# Patient Record
Sex: FEMALE | Race: WHITE | NOT HISPANIC OR LATINO | Employment: OTHER | URBAN - METROPOLITAN AREA
[De-identification: names, ages, dates, MRNs, and addresses within clinical notes are randomized per-mention and may not be internally consistent; named-entity substitution may affect disease eponyms.]

---

## 2017-01-24 RX ORDER — LOSARTAN POTASSIUM 50 MG/1
TABLET ORAL
Qty: 30 TABLET | Refills: 0 | Status: SHIPPED | OUTPATIENT
Start: 2017-01-24 | End: 2017-02-21 | Stop reason: SDUPTHER

## 2017-02-22 RX ORDER — LOSARTAN POTASSIUM 50 MG/1
TABLET ORAL
Qty: 30 TABLET | Refills: 0 | Status: SHIPPED | OUTPATIENT
Start: 2017-02-22 | End: 2017-03-23 | Stop reason: SDUPTHER

## 2017-03-23 RX ORDER — LOSARTAN POTASSIUM 50 MG/1
TABLET ORAL
Qty: 30 TABLET | Refills: 0 | Status: SHIPPED | OUTPATIENT
Start: 2017-03-23 | End: 2017-04-19 | Stop reason: SDUPTHER

## 2017-04-19 RX ORDER — LOSARTAN POTASSIUM 50 MG/1
TABLET ORAL
Qty: 30 TABLET | Refills: 0 | Status: SHIPPED | OUTPATIENT
Start: 2017-04-19 | End: 2017-05-29 | Stop reason: SDUPTHER

## 2017-05-29 RX ORDER — LOSARTAN POTASSIUM 50 MG/1
TABLET ORAL
Qty: 30 TABLET | Refills: 0 | Status: SHIPPED | OUTPATIENT
Start: 2017-05-29 | End: 2017-06-27 | Stop reason: SDUPTHER

## 2017-06-28 RX ORDER — LOSARTAN POTASSIUM 50 MG/1
TABLET ORAL
Qty: 30 TABLET | Refills: 0 | Status: SHIPPED | OUTPATIENT
Start: 2017-06-28 | End: 2017-07-28 | Stop reason: SDUPTHER

## 2017-07-31 RX ORDER — LOSARTAN POTASSIUM 50 MG/1
TABLET ORAL
Qty: 30 TABLET | Refills: 0 | Status: SHIPPED | OUTPATIENT
Start: 2017-07-31 | End: 2017-08-31 | Stop reason: SDUPTHER

## 2017-08-31 RX ORDER — LOSARTAN POTASSIUM 50 MG/1
TABLET ORAL
Qty: 30 TABLET | Refills: 0 | Status: SHIPPED | OUTPATIENT
Start: 2017-08-31 | End: 2017-09-28 | Stop reason: SDUPTHER

## 2017-09-28 RX ORDER — LOSARTAN POTASSIUM 50 MG/1
TABLET ORAL
Qty: 30 TABLET | Refills: 0 | Status: SHIPPED | OUTPATIENT
Start: 2017-09-28 | End: 2017-10-30 | Stop reason: SDUPTHER

## 2017-10-30 RX ORDER — LOSARTAN POTASSIUM 50 MG/1
TABLET ORAL
Qty: 30 TABLET | Refills: 0 | Status: SHIPPED | OUTPATIENT
Start: 2017-10-30 | End: 2018-11-28

## 2017-12-13 ENCOUNTER — OFFICE VISIT (OUTPATIENT)
Dept: OBSTETRICS AND GYNECOLOGY | Facility: CLINIC | Age: 62
End: 2017-12-13
Payer: COMMERCIAL

## 2017-12-13 ENCOUNTER — HOSPITAL ENCOUNTER (OUTPATIENT)
Dept: RADIOLOGY | Facility: HOSPITAL | Age: 62
Discharge: HOME | End: 2017-12-13
Attending: OBSTETRICS & GYNECOLOGY
Payer: COMMERCIAL

## 2017-12-13 VITALS — HEIGHT: 63 IN | BODY MASS INDEX: 32.07 KG/M2 | WEIGHT: 181 LBS

## 2017-12-13 VITALS
SYSTOLIC BLOOD PRESSURE: 132 MMHG | BODY MASS INDEX: 32.11 KG/M2 | HEIGHT: 63 IN | RESPIRATION RATE: 14 BRPM | DIASTOLIC BLOOD PRESSURE: 70 MMHG | WEIGHT: 181.19 LBS

## 2017-12-13 DIAGNOSIS — Z12.31 SCREENING MAMMOGRAM, ENCOUNTER FOR: ICD-10-CM

## 2017-12-13 DIAGNOSIS — Z12.4 ENCOUNTER FOR PAP SMEAR OF CERVIX WITH HPV DNA COTESTING: ICD-10-CM

## 2017-12-13 DIAGNOSIS — Z12.31 SCREENING MAMMOGRAM, ENCOUNTER FOR: Primary | ICD-10-CM

## 2017-12-13 PROCEDURE — 99999 PR PBB SHADOW E&M-EST. PATIENT-LVL III: CPT | Mod: PBBFAC,,, | Performed by: OBSTETRICS & GYNECOLOGY

## 2017-12-13 PROCEDURE — 77067 SCR MAMMO BI INCL CAD: CPT | Mod: TC,PN

## 2017-12-13 PROCEDURE — 88175 CYTOPATH C/V AUTO FLUID REDO: CPT

## 2017-12-13 PROCEDURE — 77067 SCR MAMMO BI INCL CAD: CPT | Mod: 26,,, | Performed by: RADIOLOGY

## 2017-12-13 PROCEDURE — 99386 PREV VISIT NEW AGE 40-64: CPT | Mod: S$GLB,,, | Performed by: OBSTETRICS & GYNECOLOGY

## 2017-12-13 PROCEDURE — 77063 BREAST TOMOSYNTHESIS BI: CPT | Mod: 26,,, | Performed by: RADIOLOGY

## 2017-12-13 PROCEDURE — 87624 HPV HI-RISK TYP POOLED RSLT: CPT

## 2017-12-13 RX ORDER — DULOXETIN HYDROCHLORIDE 60 MG/1
60 CAPSULE, DELAYED RELEASE ORAL DAILY
COMMUNITY
Start: 2017-12-06 | End: 2019-09-26 | Stop reason: SDUPTHER

## 2017-12-13 RX ORDER — ATORVASTATIN CALCIUM 20 MG/1
20 TABLET, FILM COATED ORAL NIGHTLY
COMMUNITY
Start: 2017-11-22 | End: 2019-10-17 | Stop reason: SDUPTHER

## 2017-12-13 RX ORDER — OXYBUTYNIN CHLORIDE 5 MG/1
5 TABLET, EXTENDED RELEASE ORAL NIGHTLY
COMMUNITY
Start: 2017-11-22 | End: 2019-02-05

## 2017-12-13 NOTE — PROGRESS NOTES
Chief Complaint   Patient presents with    Well Woman       History and Physical:  No LMP recorded. Patient is postmenopausal.       Daija Traylor is a 62 y.o.  female who presents today for her routine annual GYN exam. The patient has no Gynecology complaints today.       Allergies: Review of patient's allergies indicates:  No Known Allergies    Past Medical History:   Diagnosis Date    COPD (chronic obstructive pulmonary disease)     Hypercholesteremia     Hypertension        Past Surgical History:   Procedure Laterality Date    ANTERIOR CRUCIATE LIGAMENT REPAIR      CARPAL TUNNEL RELEASE      CERVICAL FUSION      TOTAL HIP ARTHROPLASTY         MEDS:   Current Outpatient Prescriptions on File Prior to Visit   Medication Sig Dispense Refill    losartan (COZAAR) 50 MG tablet TAKE 1 TABLET (50 MG TOTAL) BY MOUTH ONCE DAILY. 30 tablet 0    aspirin (ECOTRIN) 81 MG EC tablet Take 1 tablet (81 mg total) by mouth once daily.  0    [DISCONTINUED] fluticasone-salmeterol 100-50 mcg/dose (ADVAIR) 100-50 mcg/dose diskus inhaler Inhale 1 puff into the lungs 2 (two) times daily.      [DISCONTINUED] lovastatin (MEVACOR) 40 MG tablet Take 1 tablet (40 mg total) by mouth every evening. 90 tablet 0     No current facility-administered medications on file prior to visit.        OB History     No data available          Social History     Social History    Marital status: Single     Spouse name: N/A    Number of children: N/A    Years of education: N/A     Occupational History    Not on file.     Social History Main Topics    Smoking status: Current Every Day Smoker     Packs/day: 1.00     Types: Cigarettes    Smokeless tobacco: Never Used    Alcohol use 1.2 oz/week     2 Glasses of wine per week      Comment: occasionally    Drug use: Unknown    Sexual activity: Not on file     Other Topics Concern    Not on file     Social History Narrative    No narrative on file       Family History   Problem  "Relation Age of Onset    Heart disease Mother     Heart disease Father          Past medical and surgical history reviewed.   I have reviewed the patient's medical history in detail and updated the computerized patient record.        Review of System:   General: no chills, fever, night sweats, weight gain or weight loss  Psychological: no depression or suicidal ideation  Breasts: no new or changing breast lumps, nipple discharge or masses.  Respiratory: no cough, shortness of breath, or wheezing  Cardiovascular: no chest pain or dyspnea on exertion  Gastrointestinal: no abdominal pain, change in bowel habits, or black or bloody stools  Genito-Urinary: no incontinence, urinary frequency/urgency or vulvar/vaginal symptoms, pelvic pain or abnormal vaginal bleeding.  Musculoskeletal: no gait disturbance or muscular weakness      Physical Exam:   /70   Resp 14   Ht 5' 3" (1.6 m)   Wt 82.2 kg (181 lb 3.5 oz)   BMI 32.10 kg/m²   Constitutional: She is oriented to person, place, and time. She appears well-developed and well-nourished. No distress.   HENT:   Head: Normocephalic and atraumatic.   Eyes: Conjunctivae and EOM are normal. No scleral icterus.   Neck: Normal range of motion. Neck supple. No tracheal deviation present.   Cardiovascular: Normal rate.    Pulmonary/Chest: Effort normal. No respiratory distress. She exhibits no tenderness.  Breasts: are symmetrical.   Right breast exhibits no inverted nipple, no mass, no nipple discharge, no skin change and no tenderness.   Left breast exhibits no inverted nipple, no mass, no nipple discharge, no skin change and no tenderness.  Abdominal: Soft. She exhibits no distension and no mass. There is no tenderness. There is no rebound and no guarding.   Genitourinary:    External rectal exam shows no thrombosed external hemorrhoids.    Pelvic exam was performed with patient supine.   No labial fusion.   There is no rash, lesion or injury on the right labia.   There " is no rash, lesion or injury on the left labia.   No bleeding and no signs of injury around the vaginal introitus, urethra is without lesions and well supported. The cervix is visualized with no discharge, lesions or friability.   No vaginal discharge found.   No significant Cystocele, Enterocele or rectocele, and uterus well supported.   Bimanual exam:   The urethra is normal to palpation and there are no palpable vaginal wall masses.   Uterus is not deviated, not enlarged, not fixed, normal shape and not tender.   Cervix exhibits no motion tenderness.    Right adnexum displays no mass and no tenderness.   Left adnexum displays no mass and no tenderness.  Musculoskeletal: Normal range of motion.   Lymphadenopathy: No inguinal adenopathy present.   Neurological: She is alert and oriented to person, place, and time. Coordination normal.   Skin: Skin is warm and dry. She is not diaphoretic.   Psychiatric: She has a normal mood and affect.      Assessment:   Normal annual GYN exam  1. Screening mammogram, encounter for  CANCELED: Mammo Digital Screening Bilat With CAD   tobacco abuse- counseled    Plan:   PAP  Mammogram  Follow up in 1 year.  Patient informed will be contacted with results within 2 weeks. Encouraged to please call back or email if she has not heard from us by then.

## 2017-12-18 LAB
HPV16 AG SPEC QL: NEGATIVE
HPV16+18+H RISK 12 DNA CVX-IMP: NEGATIVE
HPV18 DNA SPEC QL NAA+PROBE: NEGATIVE

## 2017-12-20 ENCOUNTER — TELEPHONE (OUTPATIENT)
Dept: RADIOLOGY | Facility: HOSPITAL | Age: 62
End: 2017-12-20

## 2017-12-20 NOTE — TELEPHONE ENCOUNTER
..Patient notified of mammogram results,   Diagnostic mammogram is scheduled on 12/26/2017.    Additional views scheduled and noed on ammmogram

## 2017-12-26 ENCOUNTER — HOSPITAL ENCOUNTER (OUTPATIENT)
Dept: RADIOLOGY | Facility: HOSPITAL | Age: 62
Discharge: HOME | End: 2017-12-26
Attending: INTERNAL MEDICINE
Payer: COMMERCIAL

## 2017-12-26 ENCOUNTER — HOSPITAL ENCOUNTER (OUTPATIENT)
Dept: RADIOLOGY | Facility: HOSPITAL | Age: 62
Discharge: HOME | End: 2017-12-26
Attending: OBSTETRICS & GYNECOLOGY
Payer: COMMERCIAL

## 2017-12-26 DIAGNOSIS — R92.8 ABNORMAL MAMMOGRAM OF LEFT BREAST: ICD-10-CM

## 2017-12-26 PROCEDURE — 76642 ULTRASOUND BREAST LIMITED: CPT | Mod: 26,LT,, | Performed by: RADIOLOGY

## 2017-12-26 PROCEDURE — 77061 BREAST TOMOSYNTHESIS UNI: CPT | Mod: TC,PO,LT

## 2017-12-26 PROCEDURE — 76642 ULTRASOUND BREAST LIMITED: CPT | Mod: TC,PO,LT

## 2017-12-26 PROCEDURE — 77061 BREAST TOMOSYNTHESIS UNI: CPT | Mod: 26,LT,, | Performed by: RADIOLOGY

## 2017-12-26 PROCEDURE — 77065 DX MAMMO INCL CAD UNI: CPT | Mod: 26,LT,, | Performed by: RADIOLOGY

## 2018-03-14 PROBLEM — I25.10 CORONARY ARTERY DISEASE INVOLVING NATIVE CORONARY ARTERY OF NATIVE HEART WITHOUT ANGINA PECTORIS: Status: ACTIVE | Noted: 2018-03-14

## 2018-03-14 PROBLEM — I10 HYPERTENSION: Status: ACTIVE | Noted: 2018-03-14

## 2018-04-24 ENCOUNTER — OFFICE VISIT (OUTPATIENT)
Dept: URGENT CARE | Facility: CLINIC | Age: 63
End: 2018-04-24
Payer: COMMERCIAL

## 2018-04-24 VITALS
RESPIRATION RATE: 16 BRPM | OXYGEN SATURATION: 97 % | TEMPERATURE: 99 F | HEART RATE: 66 BPM | SYSTOLIC BLOOD PRESSURE: 130 MMHG | DIASTOLIC BLOOD PRESSURE: 82 MMHG

## 2018-04-24 DIAGNOSIS — J06.9 VIRAL URI WITH COUGH: Primary | ICD-10-CM

## 2018-04-24 PROCEDURE — 3075F SYST BP GE 130 - 139MM HG: CPT | Mod: CPTII,S$GLB,, | Performed by: PHYSICIAN ASSISTANT

## 2018-04-24 PROCEDURE — 99204 OFFICE O/P NEW MOD 45 MIN: CPT | Mod: S$GLB,,, | Performed by: PHYSICIAN ASSISTANT

## 2018-04-24 PROCEDURE — 96372 THER/PROPH/DIAG INJ SC/IM: CPT | Mod: S$GLB,,, | Performed by: FAMILY MEDICINE

## 2018-04-24 PROCEDURE — 3079F DIAST BP 80-89 MM HG: CPT | Mod: CPTII,S$GLB,, | Performed by: PHYSICIAN ASSISTANT

## 2018-04-24 RX ORDER — BETAMETHASONE SODIUM PHOSPHATE AND BETAMETHASONE ACETATE 3; 3 MG/ML; MG/ML
6 INJECTION, SUSPENSION INTRA-ARTICULAR; INTRALESIONAL; INTRAMUSCULAR; SOFT TISSUE
Status: COMPLETED | OUTPATIENT
Start: 2018-04-24 | End: 2018-04-24

## 2018-04-24 RX ORDER — MULTIVIT WITH MINERALS/HERBS
1 TABLET ORAL DAILY
COMMUNITY
End: 2019-10-17

## 2018-04-24 RX ORDER — ASPIRIN 81 MG/1
81 TABLET ORAL DAILY
COMMUNITY
End: 2019-05-25

## 2018-04-24 RX ADMIN — BETAMETHASONE SODIUM PHOSPHATE AND BETAMETHASONE ACETATE 6 MG: 3; 3 INJECTION, SUSPENSION INTRA-ARTICULAR; INTRALESIONAL; INTRAMUSCULAR; SOFT TISSUE at 09:04

## 2018-04-24 NOTE — PROGRESS NOTES
Subjective:       Patient ID: Daija Traylor is a 63 y.o. female.    Vitals:  oral temperature is 98.6 °F (37 °C). Her blood pressure is 130/82 and her pulse is 66. Her respiration is 16 and oxygen saturation is 97%.     Chief Complaint: Cough    Pt c/o productive cough, 5 days, nasal congestion, post nasal drip, ear congestion, took z pack, tessalon and mucinex with no relief,       Cough   This is a new problem. The current episode started in the past 7 days. The problem has been unchanged. The problem occurs constantly. The cough is productive of sputum. Associated symptoms include ear congestion, nasal congestion and postnasal drip. Pertinent negatives include no chest pain, chills, ear pain, eye redness, fever, headaches, myalgias, sore throat, shortness of breath or wheezing. Treatments tried: zpack, tessalon, mucinex. The treatment provided no relief.     Review of Systems   Constitution: Positive for malaise/fatigue. Negative for chills and fever.   HENT: Positive for congestion and postnasal drip. Negative for ear pain, hoarse voice and sore throat.    Eyes: Negative for discharge and redness.   Cardiovascular: Negative for chest pain, dyspnea on exertion and leg swelling.   Respiratory: Positive for cough and sputum production. Negative for shortness of breath and wheezing.    Musculoskeletal: Negative for myalgias.   Gastrointestinal: Negative for abdominal pain and nausea.   Neurological: Negative for headaches.       Objective:      Physical Exam   Constitutional: She is oriented to person, place, and time. She appears well-developed and well-nourished. She is cooperative.  Non-toxic appearance. She does not appear ill. No distress.   HENT:   Head: Normocephalic and atraumatic.   Right Ear: Hearing, external ear and ear canal normal. Tympanic membrane is not erythematous. A middle ear effusion is present.   Left Ear: Hearing, external ear and ear canal normal. Tympanic membrane is not erythematous. A  middle ear effusion is present.   Nose: Nose normal. No mucosal edema, rhinorrhea or nasal deformity. No epistaxis. Right sinus exhibits no maxillary sinus tenderness and no frontal sinus tenderness. Left sinus exhibits no maxillary sinus tenderness and no frontal sinus tenderness.   Mouth/Throat: Uvula is midline, oropharynx is clear and moist and mucous membranes are normal. No trismus in the jaw. Normal dentition. No uvula swelling. No posterior oropharyngeal erythema.   Eyes: Conjunctivae and lids are normal. No scleral icterus.   Sclera clear bilat   Neck: Trachea normal, full passive range of motion without pain and phonation normal. Neck supple.   Cardiovascular: Normal rate, regular rhythm, normal heart sounds, intact distal pulses and normal pulses.    Pulmonary/Chest: Effort normal and breath sounds normal. No respiratory distress. She has no wheezes.   Abdominal: Soft. Normal appearance and bowel sounds are normal. She exhibits no distension. There is no tenderness.   Musculoskeletal: Normal range of motion. She exhibits no edema or deformity.   Neurological: She is alert and oriented to person, place, and time. She exhibits normal muscle tone. Coordination normal.   Skin: Skin is warm, dry and intact. She is not diaphoretic. No pallor.   Psychiatric: She has a normal mood and affect. Her speech is normal and behavior is normal. Judgment and thought content normal. Cognition and memory are normal.   Nursing note and vitals reviewed.      Assessment:       1. Viral URI with cough        Plan:         Viral URI with cough    Other orders  -     betamethasone acetate-betamethasone sodium phosphate injection 6 mg; Inject 1 mL (6 mg total) into the muscle one time.      Discussed risks vs benefits of steroid injection and patient elects to proceed. Will continue with Tessalon perles or Coricidin HBP for symptoms. Explained likely viral and cough may persist for several weeks. Patient is a smoker. Discussed  smoking cessation. I discussed with the patient the importance of follow up if their symptoms have not resolved in 1-2 week's time. Patient verbalized understanding and will RTC or go to the nearest ER if symptoms persist or worsen.

## 2018-12-12 PROBLEM — K21.9 GASTROESOPHAGEAL REFLUX: Status: ACTIVE | Noted: 2018-12-12

## 2019-02-11 PROBLEM — K82.8 BILIARY DYSKINESIA: Status: ACTIVE | Noted: 2019-02-11

## 2019-05-28 RX ORDER — ATORVASTATIN CALCIUM 10 MG/1
TABLET, FILM COATED ORAL
Qty: 30 TABLET | Refills: 1 | Status: SHIPPED | OUTPATIENT
Start: 2019-05-28 | End: 2019-10-17

## 2019-09-01 RX ORDER — LOSARTAN POTASSIUM 50 MG/1
TABLET ORAL
Qty: 30 TABLET | Refills: 2 | Status: SHIPPED | OUTPATIENT
Start: 2019-09-01 | End: 2019-09-05 | Stop reason: SDUPTHER

## 2019-09-05 DIAGNOSIS — I10 HYPERTENSION, UNSPECIFIED TYPE: Primary | ICD-10-CM

## 2019-09-06 RX ORDER — LOSARTAN POTASSIUM 50 MG/1
50 TABLET ORAL DAILY
Qty: 90 TABLET | Refills: 3 | Status: SHIPPED | OUTPATIENT
Start: 2019-09-06 | End: 2020-03-24 | Stop reason: SDUPTHER

## 2019-09-27 RX ORDER — DULOXETIN HYDROCHLORIDE 60 MG/1
CAPSULE, DELAYED RELEASE ORAL
Qty: 90 CAPSULE | Refills: 3 | Status: SHIPPED | OUTPATIENT
Start: 2019-09-27

## 2019-10-09 ENCOUNTER — TELEPHONE (OUTPATIENT)
Dept: FAMILY MEDICINE | Facility: CLINIC | Age: 64
End: 2019-10-09

## 2019-10-09 NOTE — TELEPHONE ENCOUNTER
----- Message from Marilynn Izquierdo sent at 10/9/2019  3:25 PM CDT -----  Type: Needs Medical Advice    Who Called:  self  Symptoms (please be specific):  Has swelling above the elbow / has pain inside of elbow   How long has patient had these symptoms:  3 x days   Pharmacy name and phone #:     Best Call Back Number: [phone]  Or work  [phone]   Additional Information: not sure which Dr she should see for this ?

## 2019-10-09 NOTE — TELEPHONE ENCOUNTER
----- Message from Christina Velasco sent at 10/9/2019  3:53 PM CDT -----  Contact: patient  Type:  Patient Returning Call    Who Called:  patient  Who Left Message for Patient:  Eva   Does the patient know what this is regarding?:  yes  Best Call Back Number:  [phone]- her office number   Additional Information:

## 2019-10-17 ENCOUNTER — OFFICE VISIT (OUTPATIENT)
Dept: FAMILY MEDICINE | Facility: CLINIC | Age: 64
End: 2019-10-17
Payer: COMMERCIAL

## 2019-10-17 ENCOUNTER — TELEPHONE (OUTPATIENT)
Dept: FAMILY MEDICINE | Facility: CLINIC | Age: 64
End: 2019-10-17

## 2019-10-17 VITALS
HEART RATE: 78 BPM | SYSTOLIC BLOOD PRESSURE: 124 MMHG | BODY MASS INDEX: 33.04 KG/M2 | DIASTOLIC BLOOD PRESSURE: 78 MMHG | TEMPERATURE: 98 F | OXYGEN SATURATION: 99 % | WEIGHT: 186.5 LBS

## 2019-10-17 DIAGNOSIS — M77.10 LATERAL EPICONDYLITIS, UNSPECIFIED LATERALITY: Primary | ICD-10-CM

## 2019-10-17 DIAGNOSIS — I10 ESSENTIAL HYPERTENSION: ICD-10-CM

## 2019-10-17 DIAGNOSIS — I25.10 CORONARY ARTERY DISEASE INVOLVING NATIVE CORONARY ARTERY OF NATIVE HEART WITHOUT ANGINA PECTORIS: ICD-10-CM

## 2019-10-17 DIAGNOSIS — J44.9 CHRONIC OBSTRUCTIVE PULMONARY DISEASE, UNSPECIFIED COPD TYPE: ICD-10-CM

## 2019-10-17 DIAGNOSIS — E78.5 HYPERLIPIDEMIA, UNSPECIFIED HYPERLIPIDEMIA TYPE: ICD-10-CM

## 2019-10-17 PROCEDURE — 99204 OFFICE O/P NEW MOD 45 MIN: CPT | Mod: 25,S$GLB,, | Performed by: INTERNAL MEDICINE

## 2019-10-17 PROCEDURE — 3078F DIAST BP <80 MM HG: CPT | Mod: CPTII,S$GLB,, | Performed by: INTERNAL MEDICINE

## 2019-10-17 PROCEDURE — 99999 PR PBB SHADOW E&M-EST. PATIENT-LVL III: ICD-10-PCS | Mod: PBBFAC,,, | Performed by: INTERNAL MEDICINE

## 2019-10-17 PROCEDURE — 90686 IIV4 VACC NO PRSV 0.5 ML IM: CPT | Mod: S$GLB,,, | Performed by: INTERNAL MEDICINE

## 2019-10-17 PROCEDURE — 90471 IMMUNIZATION ADMIN: CPT | Mod: S$GLB,,, | Performed by: INTERNAL MEDICINE

## 2019-10-17 PROCEDURE — 3074F PR MOST RECENT SYSTOLIC BLOOD PRESSURE < 130 MM HG: ICD-10-PCS | Mod: CPTII,S$GLB,, | Performed by: INTERNAL MEDICINE

## 2019-10-17 PROCEDURE — 90686 FLU VACCINE (QUAD) GREATER THAN OR EQUAL TO 3YO PRESERVATIVE FREE IM: ICD-10-PCS | Mod: S$GLB,,, | Performed by: INTERNAL MEDICINE

## 2019-10-17 PROCEDURE — 3008F BODY MASS INDEX DOCD: CPT | Mod: CPTII,S$GLB,, | Performed by: INTERNAL MEDICINE

## 2019-10-17 PROCEDURE — 3078F PR MOST RECENT DIASTOLIC BLOOD PRESSURE < 80 MM HG: ICD-10-PCS | Mod: CPTII,S$GLB,, | Performed by: INTERNAL MEDICINE

## 2019-10-17 PROCEDURE — 3008F PR BODY MASS INDEX (BMI) DOCUMENTED: ICD-10-PCS | Mod: CPTII,S$GLB,, | Performed by: INTERNAL MEDICINE

## 2019-10-17 PROCEDURE — 99204 PR OFFICE/OUTPT VISIT, NEW, LEVL IV, 45-59 MIN: ICD-10-PCS | Mod: 25,S$GLB,, | Performed by: INTERNAL MEDICINE

## 2019-10-17 PROCEDURE — 3074F SYST BP LT 130 MM HG: CPT | Mod: CPTII,S$GLB,, | Performed by: INTERNAL MEDICINE

## 2019-10-17 PROCEDURE — 99999 PR PBB SHADOW E&M-EST. PATIENT-LVL III: CPT | Mod: PBBFAC,,, | Performed by: INTERNAL MEDICINE

## 2019-10-17 PROCEDURE — 90471 FLU VACCINE (QUAD) GREATER THAN OR EQUAL TO 3YO PRESERVATIVE FREE IM: ICD-10-PCS | Mod: S$GLB,,, | Performed by: INTERNAL MEDICINE

## 2019-10-17 RX ORDER — MELOXICAM 15 MG/1
15 TABLET ORAL DAILY
Qty: 30 TABLET | Refills: 0 | Status: SHIPPED | OUTPATIENT
Start: 2019-10-17 | End: 2019-11-12 | Stop reason: SDUPTHER

## 2019-10-17 RX ORDER — ATORVASTATIN CALCIUM 20 MG/1
20 TABLET, FILM COATED ORAL NIGHTLY
Qty: 90 TABLET | Refills: 3 | Status: SHIPPED | OUTPATIENT
Start: 2019-10-17 | End: 2020-01-14 | Stop reason: SDUPTHER

## 2019-10-17 RX ORDER — FLUTICASONE FUROATE AND VILANTEROL 200; 25 UG/1; UG/1
1 POWDER RESPIRATORY (INHALATION) DAILY
Qty: 60 EACH | Refills: 3 | Status: SHIPPED | OUTPATIENT
Start: 2019-10-17 | End: 2020-03-16

## 2019-10-17 NOTE — PROGRESS NOTES
Subjective     Daija Traylor is a 64 y.o. old, female here for Elbow Pain (left elbow pain for about 2 weeks)    Patient is new to clinic.  She normally sees Dr. Gold.  She is a 64-year-old with past medical history of CAD, COPD, hypertension, GERD, here with complaints of left elbow pain for the past 2 weeks.  She was dancing the night before she had symptoms.  Pain is deep in the elbow and she is slightly tender over her lateral elbow.  She does not recall any repetitive movements that she has done that have caused the problem.  There is a family history of gout but no personal history of gout.  She has never had pain like this in the past.  Pain is currently severe.  She has not tried anything for the pain. Pain radiates up the shoulder slightly.  She also complains of some unrelated left-sided neck pain below her jaw that has allodynia/hyperpathia.  She has a history of a cervical spinal fusion.  She does not remember what levels.  COPD is managed with a Breo inhaler.  She continues to smoke, would like to stop 1 day but is not yet ready.  Hypertension is controlled with losartan and Bystolic.  She reports no significant side effects or problems with these.  She needs refills of her atorvastatin but was previously on 20 mg not the 10 mg that was sent to the pharmacy recently.      Review of Systems   HENT: Negative for hearing loss.    Eyes: Negative for discharge.   Respiratory: Negative for wheezing.    Cardiovascular: Negative for chest pain and palpitations.   Gastrointestinal: Negative for blood in stool, constipation, diarrhea and vomiting.   Genitourinary: Negative for dysuria and hematuria.   Musculoskeletal: Positive for neck pain.   Neurological: Negative for weakness and headaches.   Endo/Heme/Allergies: Negative for polydipsia.   Answers for HPI/ROS submitted by the patient on 10/17/2019   activity change: No  unexpected weight change: No  rhinorrhea: No  trouble swallowing: No  visual  disturbance: No  chest tightness: No  polyuria: No  difficulty urinating: No  menstrual problem: No  joint swelling: Yes  arthralgias: Yes  confusion: No  dysphoric mood: No      Past Medical History:   Diagnosis Date    COPD (chronic obstructive pulmonary disease)     Coronary artery disease 03/2018    x1 stent    Depression     Hypercholesteremia     Hypertension     Presence of stent in coronary artery      Past Surgical History:   Procedure Laterality Date    ANTERIOR CRUCIATE LIGAMENT REPAIR      BACK SURGERY      BREAST BIOPSY      BREAST SURGERY      CARPAL TUNNEL RELEASE      CERVICAL FUSION      CHOLECYSTECTOMY      COLONOSCOPY N/A 12/12/2018    Procedure: COLONOSCOPY;  Surgeon: Jerry Cha MD;  Location: [facility] ENDO;  Service: Endoscopy;  Laterality: N/A;    ESOPHAGOGASTRODUODENOSCOPY N/A 12/12/2018    Procedure: EGD (ESOPHAGOGASTRODUODENOSCOPY);  Surgeon: Jerry Cha MD;  Location: [facility] ENDO;  Service: Endoscopy;  Laterality: N/A;    LAPAROSCOPIC CHOLECYSTECTOMY N/A 2/11/2019    Procedure: CHOLECYSTECTOMY-LAPAROSCOPIC;  Surgeon: Rome Arceo MD;  Location: [facility] OR;  Service: General;  Laterality: N/A;    ROTATOR CUFF REPAIR Right     thumb Left     fusion    TOTAL HIP ARTHROPLASTY       Review of patient's allergies indicates:  No Known Allergies  Outpatient Medications Marked as Taking for the 10/17/19 encounter (Office Visit) with Justen Alcazar MD   Medication Sig Dispense Refill    BYSTOLIC 10 mg Tab TAKE ONE TABLET BY MOUTH DAILY 90 tablet 1    DULoxetine (CYMBALTA) 60 MG capsule TAKE 1 CAPSULE BY MOUTH EVERY DAY AS DIRECTED 90 capsule 3    fluticasone/vilanterol (BREO ELLIPTA INHL) Inhale 1 puff into the lungs once daily.      losartan (COZAAR) 50 MG tablet Take 1 tablet (50 mg total) by mouth once daily. 90 tablet 3    ondansetron (ZOFRAN) 4 MG tablet Take 1 tablet (4 mg total) by mouth every 6 (six) hours. 12 tablet 0     Social History      Socioeconomic History    Marital status: Significant Other     Spouse name: Not on file    Number of children: Not on file    Years of education: Not on file    Highest education level: Not on file   Occupational History    Not on file   Social Needs    Financial resource strain: Hard    Food insecurity:     Worry: Sometimes true     Inability: Never true    Transportation needs:     Medical: No     Non-medical: No   Tobacco Use    Smoking status: Current Every Day Smoker     Packs/day: 1.00     Types: Cigarettes    Smokeless tobacco: Never Used   Substance and Sexual Activity    Alcohol use: Yes     Frequency: 2-3 times a week     Drinks per session: 1 or 2     Binge frequency: Never     Comment: 2 glasses wine every evening    Drug use: No    Sexual activity: Not on file   Lifestyle    Physical activity:     Days per week: 0 days     Minutes per session: Not on file    Stress: Very much   Relationships    Social connections:     Talks on phone: Three times a week     Gets together: Once a week     Attends [employer] service: Not on file     Active member of club or organization: No     Attends meetings of clubs or organizations: Not on file     Relationship status:    Other Topics Concern    Not on file   Social History Narrative    Not on file     Family History   Problem Relation Age of Onset    Heart disease Mother     Heart disease Father      Objective     /78   Pulse 78   Temp 98.2 °F (36.8 °C) (Oral)   Wt 84.6 kg (186 lb 8.2 oz)   SpO2 99%   BMI 33.04 kg/m²   Physical Exam   Constitutional: She is oriented to person, place, and time. She appears well-developed. No distress.   HENT:   Head: Normocephalic and atraumatic.   Mouth/Throat: Oropharynx is clear and moist and mucous membranes are normal.   Eyes: Pupils are equal, round, and reactive to light. Conjunctivae are normal.   Neck: Neck supple. No thyroid mass and no thyromegaly present.   Cardiovascular: Normal  rate, regular rhythm and normal heart sounds.   No murmur heard.  Pulmonary/Chest: Breath sounds normal. No respiratory distress.   Abdominal: Soft. Normal appearance and bowel sounds are normal. There is no tenderness.   Musculoskeletal: She exhibits no edema or deformity.        Left elbow: Tenderness found. Lateral epicondyle tenderness noted.   Lymphadenopathy:     She has no cervical adenopathy.        Right: No supraclavicular adenopathy present.        Left: No supraclavicular adenopathy present.   Neurological: She is alert and oriented to person, place, and time.   Skin: Skin is warm and dry. No rash noted.   Psychiatric: She has a normal mood and affect. Her behavior is normal.     Assessment and Plan     1. Lateral epicondylitis, unspecified laterality  - meloxicam (MOBIC) 15 MG tablet; Take 1 tablet (15 mg total) by mouth once daily.  Dispense: 30 tablet; Refill: 0    2. Coronary artery disease involving native coronary artery of native heart without angina pectoris  Stable, smoking cessation encouraged  - atorvastatin (LIPITOR) 20 MG tablet; Take 1 tablet (20 mg total) by mouth every evening.  Dispense: 90 tablet; Refill: 3    3. Essential hypertension  Well controlled.    4. Chronic obstructive pulmonary disease, unspecified COPD type  Stable.  - fluticasone furoate-vilanterol (BREO ELLIPTA) 200-25 mcg/dose DsDv diskus inhaler; Inhale 1 puff into the lungs once daily.  Dispense: 60 each; Refill: 3    5. Hyperlipidemia, unspecified hyperlipidemia type      ___________________  Justen Alcazar MD  Internal Medicine and Pediatrics   Left arm;

## 2019-10-17 NOTE — PATIENT INSTRUCTIONS
Understanding Lateral Epicondylitis    Tendons are strong bands of tissue that connect muscles to bones. Lateral epicondylitis affects the tendons that connect muscles in the forearm to the lateral epicondyle. This is the bony knob on the outer side of the elbow. The condition occurs if the extensor tendons of the wrist become red and swollen (irritated). This can cause pain in the elbow, forearm, and wrist. Because the condition is sometimes caused by playing tennis, it is also known as tennis elbow.  How to say it  [id number]   What causes lateral epicondylitis?  The condition most often occurs because of overuse. This can be from any activity that repeatedly puts stress on the forearm extensor muscles or tendons and wrist. For instance, playing tennis, lifting weights, cutting meat, painting, and typing can all cause the condition. Wear and tear of the tendons from aging or an injury to the tendons can also cause the condition.  Symptoms of lateral epicondylitis  The most common symptom is pain. You may feel it on the outer side of the elbow and down the back of the forearm. It may be worse when moving or using the elbow, forearm, or wrist. You may also feel pain when gripping or lifting things.  Treatment for lateral epicondylitis  Treatments may include:  · Resting the elbow, forearm, and wrist. Youll need to avoid movements that can make your symptoms worse. You also may need to avoid certain sports and types of work for a time. This helps relieve symptoms and prevent further damage to the tendons.  · Changing the action that caused the problem. For instance, if the tendons were damaged from playing tennis, it may help to change your playing technique or use different equipment. This helps prevent further damage to the tendons.  · Using cold packs. Putting an ice pack on the injured area can help reduce pain and swelling.  · Taking pain medicines. Taking prescription or  over-the-counter pain medicines may help reduce pain and swelling.    · Wearing a brace. This helps reduce strain on the muscles and tendons in the forearm, which may relieve symptoms. It is very important to wear the brace properly.  · Doing exercises and physical therapy. These help improve strength and range of motion in the elbow, forearm, and wrist.  · Getting shots of medicine into the injured area. These may help relieve symptoms for a time.  · Having surgery. This may be an option if other treatments fail to relieve symptoms. In many cases, the surgeon removes the damaged tissue.  Possible complications of lateral epicondylitis  If the tendons involved dont heal properly, symptoms may return or get worse. To help prevent this, follow your treatment plan as directed.  When to call your healthcare provider  Call your healthcare provider right away if you have any of these:  · Fever of 100.4°F (38°C) or higher, or as directed  · Redness, swelling, or warmth in the elbow or forearm that gets worse  · Symptoms that dont get better with treatment, or get worse  · New symptoms   Date Last Reviewed: 3/10/2016  © 5419-2391 [employer]. [address] [place] [id number]. All rights reserved. This information is not intended as a substitute for professional medical care. Always follow your healthcare professional's instructions.

## 2019-11-12 DIAGNOSIS — M77.10 LATERAL EPICONDYLITIS, UNSPECIFIED LATERALITY: ICD-10-CM

## 2019-11-14 RX ORDER — MELOXICAM 15 MG/1
TABLET ORAL
Qty: 30 TABLET | Refills: 3 | Status: SHIPPED | OUTPATIENT
Start: 2019-11-14 | End: 2020-01-14

## 2019-12-03 ENCOUNTER — OFFICE VISIT (OUTPATIENT)
Dept: FAMILY MEDICINE | Facility: CLINIC | Age: 64
End: 2019-12-03
Payer: COMMERCIAL

## 2019-12-03 VITALS
WEIGHT: 189.63 LBS | BODY MASS INDEX: 33.6 KG/M2 | HEART RATE: 83 BPM | OXYGEN SATURATION: 97 % | DIASTOLIC BLOOD PRESSURE: 78 MMHG | SYSTOLIC BLOOD PRESSURE: 130 MMHG | TEMPERATURE: 97 F | HEIGHT: 63 IN

## 2019-12-03 DIAGNOSIS — E78.00 HYPERCHOLESTEREMIA: ICD-10-CM

## 2019-12-03 DIAGNOSIS — E78.49 OTHER HYPERLIPIDEMIA: ICD-10-CM

## 2019-12-03 DIAGNOSIS — I25.10 CORONARY ARTERY DISEASE INVOLVING NATIVE CORONARY ARTERY OF NATIVE HEART WITHOUT ANGINA PECTORIS: ICD-10-CM

## 2019-12-03 DIAGNOSIS — J01.40 ACUTE NON-RECURRENT PANSINUSITIS: Primary | ICD-10-CM

## 2019-12-03 DIAGNOSIS — M77.10 LATERAL EPICONDYLITIS, UNSPECIFIED LATERALITY: ICD-10-CM

## 2019-12-03 DIAGNOSIS — I10 ESSENTIAL HYPERTENSION: ICD-10-CM

## 2019-12-03 DIAGNOSIS — R59.1 LYMPHADENOPATHY: ICD-10-CM

## 2019-12-03 DIAGNOSIS — R73.01 IMPAIRED FASTING GLUCOSE: ICD-10-CM

## 2019-12-03 PROCEDURE — 3075F SYST BP GE 130 - 139MM HG: CPT | Mod: CPTII,S$GLB,, | Performed by: INTERNAL MEDICINE

## 2019-12-03 PROCEDURE — 3078F DIAST BP <80 MM HG: CPT | Mod: CPTII,S$GLB,, | Performed by: INTERNAL MEDICINE

## 2019-12-03 PROCEDURE — 99999 PR PBB SHADOW E&M-EST. PATIENT-LVL III: CPT | Mod: PBBFAC,,, | Performed by: INTERNAL MEDICINE

## 2019-12-03 PROCEDURE — 3078F PR MOST RECENT DIASTOLIC BLOOD PRESSURE < 80 MM HG: ICD-10-PCS | Mod: CPTII,S$GLB,, | Performed by: INTERNAL MEDICINE

## 2019-12-03 PROCEDURE — 3075F PR MOST RECENT SYSTOLIC BLOOD PRESS GE 130-139MM HG: ICD-10-PCS | Mod: CPTII,S$GLB,, | Performed by: INTERNAL MEDICINE

## 2019-12-03 PROCEDURE — 96372 THER/PROPH/DIAG INJ SC/IM: CPT | Mod: S$GLB,,, | Performed by: INTERNAL MEDICINE

## 2019-12-03 PROCEDURE — 96372 PR INJECTION,THERAP/PROPH/DIAG2ST, IM OR SUBCUT: ICD-10-PCS | Mod: S$GLB,,, | Performed by: INTERNAL MEDICINE

## 2019-12-03 PROCEDURE — 99999 PR PBB SHADOW E&M-EST. PATIENT-LVL III: ICD-10-PCS | Mod: PBBFAC,,, | Performed by: INTERNAL MEDICINE

## 2019-12-03 PROCEDURE — 99214 PR OFFICE/OUTPT VISIT, EST, LEVL IV, 30-39 MIN: ICD-10-PCS | Mod: 25,S$GLB,, | Performed by: INTERNAL MEDICINE

## 2019-12-03 PROCEDURE — 3008F PR BODY MASS INDEX (BMI) DOCUMENTED: ICD-10-PCS | Mod: CPTII,S$GLB,, | Performed by: INTERNAL MEDICINE

## 2019-12-03 PROCEDURE — 99214 OFFICE O/P EST MOD 30 MIN: CPT | Mod: 25,S$GLB,, | Performed by: INTERNAL MEDICINE

## 2019-12-03 PROCEDURE — 3008F BODY MASS INDEX DOCD: CPT | Mod: CPTII,S$GLB,, | Performed by: INTERNAL MEDICINE

## 2019-12-03 RX ORDER — TRIAMCINOLONE ACETONIDE 40 MG/ML
80 INJECTION, SUSPENSION INTRA-ARTICULAR; INTRAMUSCULAR ONCE
Status: COMPLETED | OUTPATIENT
Start: 2019-12-03 | End: 2019-12-03

## 2019-12-03 RX ORDER — AMOXICILLIN AND CLAVULANATE POTASSIUM 875; 125 MG/1; MG/1
1 TABLET, FILM COATED ORAL EVERY 12 HOURS
Qty: 14 TABLET | Refills: 0 | Status: SHIPPED | OUTPATIENT
Start: 2019-12-03 | End: 2019-12-04 | Stop reason: SDUPTHER

## 2019-12-03 RX ORDER — ASPIRIN 81 MG/1
81 TABLET ORAL DAILY
COMMUNITY

## 2019-12-03 RX ADMIN — TRIAMCINOLONE ACETONIDE 80 MG: 40 INJECTION, SUSPENSION INTRA-ARTICULAR; INTRAMUSCULAR at 05:12

## 2019-12-03 NOTE — PROGRESS NOTES
Patient ID: Daija Traylor     Chief Complaint:   Chief Complaint   Patient presents with    Adenopathy    Sinus Problem    Swollen left arm        HPI: Patient has a few Complaints today: painful gland in Left neck that can swell at times. I doubt it's cancer, but she is concerned about it. Still has pain in Left Upper Extremity lateral epicondyle= tennis elbow. She thinks it could be due to her holding her steering wheel too tightly. 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 Also has had sinus pressure, Post Nasal Drip , cough w/ various colored phlegm x 2 months- likely due to sinusitis but no COPD flare now. Labs from ER reviewed: glucose very high- check A1C along w/ other labs.     Review of Systems   Constitutional: Negative.    HENT: Positive for postnasal drip, sinus pressure and sinus pain.    Eyes: Negative.    Respiratory: Negative.    Cardiovascular: Negative.    Gastrointestinal: Negative.    Endocrine: Negative.    Genitourinary: Negative.    Musculoskeletal: Positive for arthralgias.   Skin: Negative.    Allergic/Immunologic: Negative.    Neurological: Negative.    Hematological: Negative.    Psychiatric/Behavioral: Negative.           Objective:      Physical Exam   Physical Exam   Constitutional: She is oriented to person, place, and time. She appears well-developed and well-nourished.   HENT:   Head: Normocephalic and atraumatic.   Mouth/Throat: Oropharynx is clear and moist.   Nasal congestion. bilateral tympanic membrane have fluid behind them. ? Enlarged lymphadenopathy in Left anterior chain just behind angle of mandible.    Eyes: Pupils are equal, round, and reactive to light. Conjunctivae and EOM are normal.   Neck: Normal range of motion. Neck supple.   Cardiovascular: Normal rate, regular rhythm, normal heart sounds and intact distal pulses.   Pulmonary/Chest: Effort normal and breath sounds normal.   Abdominal: Soft. Bowel sounds are normal.  "  Musculoskeletal: Normal range of motion.   Neurological: She is alert and oriented to person, place, and time.   Skin: Skin is warm and dry. Capillary refill takes less than 2 seconds.   Psychiatric: She has a normal mood and affect. Her behavior is normal. Judgment and thought content normal.   Nursing note and vitals reviewed.      Current Outpatient Medications:     aspirin (ECOTRIN) 81 MG EC tablet, Take 81 mg by mouth once daily., Disp: , Rfl:     atorvastatin (LIPITOR) 20 MG tablet, Take 1 tablet (20 mg total) by mouth every evening., Disp: 90 tablet, Rfl: 3    BYSTOLIC 10 mg Tab, TAKE ONE TABLET BY MOUTH DAILY, Disp: 90 tablet, Rfl: 1    DULoxetine (CYMBALTA) 60 MG capsule, TAKE 1 CAPSULE BY MOUTH EVERY DAY AS DIRECTED, Disp: 90 capsule, Rfl: 3    fluticasone furoate-vilanterol (BREO ELLIPTA) 200-25 mcg/dose DsDv diskus inhaler, Inhale 1 puff into the lungs once daily., Disp: 60 each, Rfl: 3    losartan (COZAAR) 50 MG tablet, Take 1 tablet (50 mg total) by mouth once daily., Disp: 90 tablet, Rfl: 3    amoxicillin-clavulanate 875-125mg (AUGMENTIN) 875-125 mg per tablet, Take 1 tablet by mouth every 12 (twelve) hours. for 7 days, Disp: 14 tablet, Rfl: 0    meloxicam (MOBIC) 15 MG tablet, TAKE ONE TABLET BY MOUTH DAILY, Disp: 30 tablet, Rfl: 3    Current Facility-Administered Medications:     triamcinolone acetonide injection 80 mg, 80 mg, Intramuscular, Once, Fahad Gold MD         Vitals:   Vitals:    12/03/19 1644   BP: 130/78   Pulse: 83   Temp: 97.3 °F (36.3 °C)   TempSrc: Temporal   SpO2: 97%   Weight: 86 kg (189 lb 9.5 oz)   Height: 5' 3" (1.6 m)      Assessment:       Patient Active Problem List    Diagnosis Date Noted    Hypercholesteremia     Impaired fasting glucose     Biliary dyskinesia 02/11/2019    Gastroesophageal reflux 12/12/2018    Hypertension 03/14/2018    Coronary artery disease involving native coronary artery of native heart without angina pectoris 03/14/2018    " Coronary artery disease 03/01/2018    Hyperlipidemia 04/05/2016    COPD (chronic obstructive pulmonary disease) 04/05/2016          Plan:       Daija was seen today for adenopathy, sinus problem and swollen left arm.    Diagnoses and all orders for this visit:    Acute non-recurrent pansinusitis  -     amoxicillin-clavulanate 875-125mg (AUGMENTIN) 875-125 mg per tablet; Take 1 tablet by mouth every 12 (twelve) hours. for 7 days  -     triamcinolone acetonide injection 80 mg    Lymphadenopathy  -     US Soft Tissue Head Neck Thyroid; Future  -     CBC auto differential; Future    Essential hypertension  -     Hepatitis C antibody; Future  Controlled     Lateral epicondylitis, unspecified laterality  Try OTC CopperFit sleeve     Other hyperlipidemia  -     Comprehensive metabolic panel; Future  -     Lipid panel; Future  Monitor     Impaired fasting glucose  -     Hemoglobin A1c; Future    Coronary artery disease involving native coronary artery of native heart without angina pectoris  Stable        There are no diagnoses linked to this encounter.

## 2019-12-04 RX ORDER — AMOXICILLIN AND CLAVULANATE POTASSIUM 875; 125 MG/1; MG/1
1 TABLET, FILM COATED ORAL EVERY 12 HOURS
Qty: 14 TABLET | Refills: 0 | Status: SHIPPED | OUTPATIENT
Start: 2019-12-04 | End: 2019-12-11

## 2019-12-09 ENCOUNTER — HOSPITAL ENCOUNTER (OUTPATIENT)
Dept: RADIOLOGY | Facility: HOSPITAL | Age: 64
Discharge: HOME | End: 2019-12-09
Attending: INTERNAL MEDICINE
Payer: COMMERCIAL

## 2019-12-09 ENCOUNTER — TELEPHONE (OUTPATIENT)
Dept: ADMINISTRATIVE | Facility: HOSPITAL | Age: 64
End: 2019-12-09

## 2019-12-09 DIAGNOSIS — R22.1 NECK MASS: ICD-10-CM

## 2019-12-09 DIAGNOSIS — Z12.39 BREAST CANCER SCREENING: Primary | ICD-10-CM

## 2019-12-09 DIAGNOSIS — R59.1 LYMPHADENOPATHY: ICD-10-CM

## 2019-12-09 PROCEDURE — 76536 US EXAM OF HEAD AND NECK: CPT | Mod: TC,PO

## 2019-12-09 PROCEDURE — 76536 US EXAM OF HEAD AND NECK: CPT | Mod: 26,,, | Performed by: RADIOLOGY

## 2019-12-09 PROCEDURE — 76536 US SOFT TISSUE HEAD NECK THYROID: ICD-10-PCS | Mod: 26,,, | Performed by: RADIOLOGY

## 2019-12-12 ENCOUNTER — PATIENT OUTREACH (OUTPATIENT)
Dept: ADMINISTRATIVE | Facility: HOSPITAL | Age: 64
End: 2019-12-12

## 2019-12-13 ENCOUNTER — TELEPHONE (OUTPATIENT)
Dept: FAMILY MEDICINE | Facility: CLINIC | Age: 64
End: 2019-12-13

## 2019-12-13 DIAGNOSIS — E11.9 TYPE 2 DIABETES MELLITUS WITHOUT COMPLICATION, UNSPECIFIED WHETHER LONG TERM INSULIN USE: ICD-10-CM

## 2019-12-13 DIAGNOSIS — K11.9 LESION OF PAROTID GLAND: Primary | ICD-10-CM

## 2019-12-13 NOTE — TELEPHONE ENCOUNTER
----- Message from Marta Feliz sent at 12/13/2019  1:03 PM CST -----  Type:  Test Results    Who Called:  Patient  Name of Test (Lab/Mammo/Etc):  CT Scan  Date of Test:  12/12/2019  Ordering Provider:  Dr. Fahad Gold  Where the test was performed:  [facility]  Best Call Back Number:  [phone]  Additional Information:

## 2019-12-13 NOTE — TELEPHONE ENCOUNTER
----- Message from Yamel Yanes sent at 12/13/2019  9:38 AM CST -----  Contact: self   Placed call to pod, patient want to speak with a nurse regarding test results please call back at [phone] (home)     Case number [id number]

## 2019-12-13 NOTE — TELEPHONE ENCOUNTER
Returned patient's call. Informed patient someone will call her with test results today. I sent a message to Dr. Gold to review test results.

## 2019-12-13 NOTE — TELEPHONE ENCOUNTER
----- Message from Zaria De Anda sent at 12/13/2019  2:27 PM CST -----  Contact: pt  Pt states that she had called at 9:30 am this morning , very anxiety,worried if she has to wait through the weekend,concerning results of CT...[phone] or [phone]

## 2019-12-19 ENCOUNTER — TELEPHONE (OUTPATIENT)
Dept: ADMINISTRATIVE | Facility: HOSPITAL | Age: 64
End: 2019-12-19

## 2019-12-19 NOTE — TELEPHONE ENCOUNTER
Spoke with patient and scheduled her for a Diabetic Eyecam Exam following her appt with  on 12-26-19.

## 2019-12-26 ENCOUNTER — OFFICE VISIT (OUTPATIENT)
Dept: FAMILY MEDICINE | Facility: CLINIC | Age: 64
End: 2019-12-26
Payer: COMMERCIAL

## 2019-12-26 ENCOUNTER — CLINICAL SUPPORT (OUTPATIENT)
Dept: FAMILY MEDICINE | Facility: CLINIC | Age: 64
End: 2019-12-26
Attending: INTERNAL MEDICINE
Payer: COMMERCIAL

## 2019-12-26 VITALS
HEART RATE: 75 BPM | HEIGHT: 63 IN | BODY MASS INDEX: 33.6 KG/M2 | TEMPERATURE: 98 F | WEIGHT: 189.63 LBS | OXYGEN SATURATION: 98 % | DIASTOLIC BLOOD PRESSURE: 86 MMHG | SYSTOLIC BLOOD PRESSURE: 138 MMHG

## 2019-12-26 DIAGNOSIS — S61.412A LACERATION OF LEFT HAND WITHOUT FOREIGN BODY, INITIAL ENCOUNTER: ICD-10-CM

## 2019-12-26 DIAGNOSIS — I10 ESSENTIAL HYPERTENSION: ICD-10-CM

## 2019-12-26 DIAGNOSIS — K11.9 LESION OF PAROTID GLAND: ICD-10-CM

## 2019-12-26 DIAGNOSIS — E78.49 OTHER HYPERLIPIDEMIA: ICD-10-CM

## 2019-12-26 DIAGNOSIS — E11.9 TYPE 2 DIABETES MELLITUS WITHOUT COMPLICATION, UNSPECIFIED WHETHER LONG TERM INSULIN USE: ICD-10-CM

## 2019-12-26 DIAGNOSIS — E11.9 TYPE 2 DIABETES MELLITUS WITHOUT COMPLICATION, WITHOUT LONG-TERM CURRENT USE OF INSULIN: Primary | ICD-10-CM

## 2019-12-26 PROCEDURE — 99999 PR PBB SHADOW E&M-EST. PATIENT-LVL III: ICD-10-PCS | Mod: PBBFAC,,, | Performed by: INTERNAL MEDICINE

## 2019-12-26 PROCEDURE — 3008F BODY MASS INDEX DOCD: CPT | Mod: CPTII,S$GLB,, | Performed by: INTERNAL MEDICINE

## 2019-12-26 PROCEDURE — 99214 PR OFFICE/OUTPT VISIT, EST, LEVL IV, 30-39 MIN: ICD-10-PCS | Mod: S$GLB,,, | Performed by: INTERNAL MEDICINE

## 2019-12-26 PROCEDURE — 2022F DILAT RTA XM EVC RTNOPTHY: CPT | Mod: S$GLB,,, | Performed by: OPTOMETRIST

## 2019-12-26 PROCEDURE — 99999 PR PBB SHADOW E&M-EST. PATIENT-LVL III: CPT | Mod: PBBFAC,,, | Performed by: INTERNAL MEDICINE

## 2019-12-26 PROCEDURE — 99214 OFFICE O/P EST MOD 30 MIN: CPT | Mod: S$GLB,,, | Performed by: INTERNAL MEDICINE

## 2019-12-26 PROCEDURE — 92250 FUNDUS PHOTOGRAPHY W/I&R: CPT | Mod: 26,S$GLB,, | Performed by: OPTOMETRIST

## 2019-12-26 PROCEDURE — 3079F DIAST BP 80-89 MM HG: CPT | Mod: CPTII,S$GLB,, | Performed by: INTERNAL MEDICINE

## 2019-12-26 PROCEDURE — 3075F SYST BP GE 130 - 139MM HG: CPT | Mod: CPTII,S$GLB,, | Performed by: INTERNAL MEDICINE

## 2019-12-26 PROCEDURE — 99999 PR PBB SHADOW E&M-EST. PATIENT-LVL I: CPT | Mod: PBBFAC,,,

## 2019-12-26 PROCEDURE — 3075F PR MOST RECENT SYSTOLIC BLOOD PRESS GE 130-139MM HG: ICD-10-PCS | Mod: CPTII,S$GLB,, | Performed by: INTERNAL MEDICINE

## 2019-12-26 PROCEDURE — 3008F PR BODY MASS INDEX (BMI) DOCUMENTED: ICD-10-PCS | Mod: CPTII,S$GLB,, | Performed by: INTERNAL MEDICINE

## 2019-12-26 PROCEDURE — 2022F DIABETIC EYE SCREENING PHOTO: ICD-10-PCS | Mod: S$GLB,,, | Performed by: OPTOMETRIST

## 2019-12-26 PROCEDURE — 3079F PR MOST RECENT DIASTOLIC BLOOD PRESSURE 80-89 MM HG: ICD-10-PCS | Mod: CPTII,S$GLB,, | Performed by: INTERNAL MEDICINE

## 2019-12-26 PROCEDURE — 99999 PR PBB SHADOW E&M-EST. PATIENT-LVL I: ICD-10-PCS | Mod: PBBFAC,,,

## 2019-12-26 PROCEDURE — 92250 DIABETIC EYE SCREENING PHOTO: ICD-10-PCS | Mod: 26,S$GLB,, | Performed by: OPTOMETRIST

## 2019-12-26 RX ORDER — METFORMIN HYDROCHLORIDE 500 MG/1
500 TABLET ORAL 2 TIMES DAILY WITH MEALS
Qty: 60 TABLET | Refills: 3 | Status: SHIPPED | OUTPATIENT
Start: 2019-12-26 | End: 2020-04-23

## 2019-12-26 RX ORDER — LANCETS
EACH MISCELLANEOUS
Qty: 90 EACH | Refills: 3 | Status: SHIPPED | OUTPATIENT
Start: 2019-12-26

## 2019-12-26 RX ORDER — MUPIROCIN 20 MG/G
OINTMENT TOPICAL 3 TIMES DAILY
Qty: 15 G | Refills: 0 | Status: SHIPPED | OUTPATIENT
Start: 2019-12-26 | End: 2020-01-27

## 2019-12-26 RX ORDER — INSULIN PUMP SYRINGE, 3 ML
EACH MISCELLANEOUS
Qty: 1 EACH | Refills: 0 | Status: SHIPPED | OUTPATIENT
Start: 2019-12-26 | End: 2020-12-25

## 2019-12-26 NOTE — PROGRESS NOTES
Daija Traylor is a 64 y.o. female here for a diabetic eye screening with non-dilated fundus photos per Dr. Gold.    Patient cooperative?: Yes  Small pupils?: Yes  Last eye exam: never    For exam results, see Encounter Report.

## 2019-12-26 NOTE — PROGRESS NOTES
Patient ID: Daija Traylor     Chief Complaint:   Chief Complaint   Patient presents with    6 week office return        HPI: Follow up for diabetes mellitus that is a new diagnosis. A1C = 7.9. We decided to start Metformin and monitor for stomach pains/ diarrhea. LDL= 90 and she's already on Atorvastatin 20 mg / day- will lower fat in diet. Eye exam and urine microalbumin test today. She saw Dr. Hurley for Left neck mass and he's not too concerned about a cancer, but I don't have those notes to review. No biopsy reccommended. Perhaps reactive lymphadenopathy? It's  to touch, but she can't feel the mass. Maybe from smoking? She also cut her Left thenar emenance w/ a  a few days ago. Site is C/D/I without infection- will add Mupirocin ointment. Up to date on tetanus.     Review of Systems   Constitutional: Negative.    HENT: Negative.    Eyes: Negative.    Respiratory: Negative.    Cardiovascular: Negative.    Gastrointestinal: Negative.    Endocrine: Negative.    Genitourinary: Negative.    Musculoskeletal: Negative.    Skin: Negative.    Allergic/Immunologic: Negative.    Neurological: Negative.    Hematological: Negative.    Psychiatric/Behavioral: Negative.           Objective:      Physical Exam   Physical Exam   Constitutional: She is oriented to person, place, and time. She appears well-developed and well-nourished.   HENT:   Head: Normocephalic and atraumatic.   Nose: Nose normal.   Mouth/Throat: Oropharynx is clear and moist.   Eyes: Pupils are equal, round, and reactive to light. Conjunctivae and EOM are normal.   Neck: Normal range of motion. Neck supple.   Cardiovascular: Normal rate, regular rhythm, normal heart sounds and intact distal pulses.   Pulmonary/Chest: Effort normal.   Slightly decreased Breath Sounds but clear and not wheezing   Abdominal: Soft. Bowel sounds are normal.   Musculoskeletal: Normal range of motion.   Neurological: She is alert and oriented to person,  place, and time.   Skin: Skin is warm and dry. Capillary refill takes less than 2 seconds.   1 inch laceration to Left thenar eminence- C/D/I without infection     Psychiatric: She has a normal mood and affect. Her behavior is normal. Judgment and thought content normal.   Nursing note and vitals reviewed.    Protective Sensation (w/ 10 gram monofilament):  Right: Intact  Left: Intact    Visual Inspection:  Normal -  Bilateral    Pedal Pulses:   Right: Present  Left: Present    Posterior tibialis:   Right:Present  Left: Present        Current Outpatient Medications:     aspirin (ECOTRIN) 81 MG EC tablet, Take 81 mg by mouth once daily., Disp: , Rfl:     atorvastatin (LIPITOR) 20 MG tablet, Take 1 tablet (20 mg total) by mouth every evening., Disp: 90 tablet, Rfl: 3    BYSTOLIC 10 mg Tab, TAKE ONE TABLET BY MOUTH DAILY, Disp: 90 tablet, Rfl: 1    DULoxetine (CYMBALTA) 60 MG capsule, TAKE 1 CAPSULE BY MOUTH EVERY DAY AS DIRECTED, Disp: 90 capsule, Rfl: 3    fluticasone furoate-vilanterol (BREO ELLIPTA) 200-25 mcg/dose DsDv diskus inhaler, Inhale 1 puff into the lungs once daily., Disp: 60 each, Rfl: 3    losartan (COZAAR) 50 MG tablet, Take 1 tablet (50 mg total) by mouth once daily., Disp: 90 tablet, Rfl: 3    meloxicam (MOBIC) 15 MG tablet, TAKE ONE TABLET BY MOUTH DAILY, Disp: 30 tablet, Rfl: 3    blood sugar diagnostic Strp, To check BG 2 times daily, to use with insurance preferred meter, Disp: 90 strip, Rfl: 3    blood-glucose meter kit, To check BG 2 times daily, to use with insurance preferred meter, Disp: 1 each, Rfl: 0    lancets Misc, To check BG 2 times daily, to use with insurance preferred meter, Disp: 90 each, Rfl: 3    metFORMIN (GLUCOPHAGE) 500 MG tablet, Take 1 tablet (500 mg total) by mouth 2 (two) times daily with meals., Disp: 60 tablet, Rfl: 3    mupirocin (BACTROBAN) 2 % ointment, Apply topically 3 (three) times daily., Disp: 15 g, Rfl: 0         Vitals:   Vitals:    12/26/19 0807  "  BP: 138/86   BP Location: Left arm   Patient Position: Sitting   BP Method: Medium (Manual)   Pulse: 75   Temp: 98.1 °F (36.7 °C)   TempSrc: Temporal   SpO2: 98%   Weight: 86 kg (189 lb 9.5 oz)   Height: 5' 3" (1.6 m)      Assessment:       Patient Active Problem List    Diagnosis Date Noted    Type 2 diabetes mellitus without complication, without long-term current use of insulin 12/26/2019    Laceration of left hand without foreign body 12/26/2019    Lesion of parotid gland 12/26/2019    Hypercholesteremia     Impaired fasting glucose     Biliary dyskinesia 02/11/2019    Gastroesophageal reflux 12/12/2018    Essential hypertension 03/14/2018    Coronary artery disease involving native coronary artery of native heart without angina pectoris 03/14/2018    Coronary artery disease 03/01/2018    Hyperlipidemia 04/05/2016    COPD (chronic obstructive pulmonary disease) 04/05/2016          Plan:       Daija was seen today for 6 week office return.    Diagnoses and all orders for this visit:    Type 2 diabetes mellitus without complication, without long-term current use of insulin  -     metFORMIN (GLUCOPHAGE) 500 MG tablet; Take 1 tablet (500 mg total) by mouth 2 (two) times daily with meals.  -     Microalbumin/creatinine urine ratio; Future  -     blood-glucose meter kit; To check BG 2 times daily, to use with insurance preferred meter  -     lancets Misc; To check BG 2 times daily, to use with insurance preferred meter  -     blood sugar diagnostic Strp; To check BG 2 times daily, to use with insurance preferred meter  Check sugars daily and record them     Laceration of left hand without foreign body, initial encounter  -     mupirocin (BACTROBAN) 2 % ointment; Apply topically 3 (three) times daily.    Essential hypertension  Controlled    Lesion of parotid gland  Per Dr. Hurley     Other hyperlipidemia  LDL close to goal < 70- improved diet.               "

## 2019-12-27 ENCOUNTER — HOSPITAL ENCOUNTER (OUTPATIENT)
Dept: RADIOLOGY | Facility: HOSPITAL | Age: 64
Discharge: HOME | End: 2019-12-27
Attending: INTERNAL MEDICINE
Payer: COMMERCIAL

## 2019-12-27 VITALS — WEIGHT: 189.63 LBS | HEIGHT: 63 IN | BODY MASS INDEX: 33.6 KG/M2

## 2019-12-27 DIAGNOSIS — Z12.31 SCREENING MAMMOGRAM, ENCOUNTER FOR: ICD-10-CM

## 2019-12-27 PROCEDURE — 77063 BREAST TOMOSYNTHESIS BI: CPT | Mod: 26,,, | Performed by: RADIOLOGY

## 2019-12-27 PROCEDURE — 77067 MAMMO DIGITAL SCREENING BILAT WITH TOMOSYNTHESIS_CAD: ICD-10-PCS | Mod: 26,,, | Performed by: RADIOLOGY

## 2019-12-27 PROCEDURE — 77067 SCR MAMMO BI INCL CAD: CPT | Mod: 26,,, | Performed by: RADIOLOGY

## 2019-12-27 PROCEDURE — 77063 MAMMO DIGITAL SCREENING BILAT WITH TOMOSYNTHESIS_CAD: ICD-10-PCS | Mod: 26,,, | Performed by: RADIOLOGY

## 2019-12-27 PROCEDURE — 77063 BREAST TOMOSYNTHESIS BI: CPT | Mod: TC,PN

## 2019-12-30 ENCOUNTER — TELEPHONE (OUTPATIENT)
Dept: OPHTHALMOLOGY | Facility: CLINIC | Age: 64
End: 2019-12-30

## 2019-12-30 NOTE — TELEPHONE ENCOUNTER
Called patient regarding diabetic eye screening results ; lvm; No gross retinopathy noted OU. Advise dilated fundus exam in 12 months.    ----- Message from GABE Quintana, OD sent at 12/26/2019  4:15 PM CST -----  No gross retinopathy noted OU. Advise dilated fundus exam in 12 months.

## 2020-01-06 ENCOUNTER — TELEPHONE (OUTPATIENT)
Dept: OPHTHALMOLOGY | Facility: CLINIC | Age: 65
End: 2020-01-06

## 2020-01-17 ENCOUNTER — PATIENT MESSAGE (OUTPATIENT)
Dept: FAMILY MEDICINE | Facility: CLINIC | Age: 65
End: 2020-01-17

## 2020-01-18 NOTE — TELEPHONE ENCOUNTER
I'm not exactly sure. I wonder if it's the Metformin? Those are certainly not side effects I've encountered, but anything's possible. Stop the Metformin for a day ad see how you feel.

## 2020-01-27 ENCOUNTER — OFFICE VISIT (OUTPATIENT)
Dept: FAMILY MEDICINE | Facility: CLINIC | Age: 65
End: 2020-01-27
Payer: COMMERCIAL

## 2020-01-27 VITALS
HEIGHT: 63 IN | OXYGEN SATURATION: 95 % | SYSTOLIC BLOOD PRESSURE: 132 MMHG | TEMPERATURE: 99 F | DIASTOLIC BLOOD PRESSURE: 76 MMHG | WEIGHT: 188.69 LBS | HEART RATE: 67 BPM | BODY MASS INDEX: 33.43 KG/M2

## 2020-01-27 DIAGNOSIS — F99 INSOMNIA DUE TO OTHER MENTAL DISORDER: ICD-10-CM

## 2020-01-27 DIAGNOSIS — R53.82 CHRONIC FATIGUE: ICD-10-CM

## 2020-01-27 DIAGNOSIS — J44.9 CHRONIC OBSTRUCTIVE PULMONARY DISEASE, UNSPECIFIED COPD TYPE: ICD-10-CM

## 2020-01-27 DIAGNOSIS — I10 ESSENTIAL HYPERTENSION: ICD-10-CM

## 2020-01-27 DIAGNOSIS — E78.49 OTHER HYPERLIPIDEMIA: ICD-10-CM

## 2020-01-27 DIAGNOSIS — E11.9 TYPE 2 DIABETES MELLITUS WITHOUT COMPLICATION, WITHOUT LONG-TERM CURRENT USE OF INSULIN: Primary | ICD-10-CM

## 2020-01-27 DIAGNOSIS — K11.9 LESION OF PAROTID GLAND: ICD-10-CM

## 2020-01-27 DIAGNOSIS — F51.05 INSOMNIA DUE TO OTHER MENTAL DISORDER: ICD-10-CM

## 2020-01-27 PROCEDURE — 99999 PR PBB SHADOW E&M-EST. PATIENT-LVL III: CPT | Mod: PBBFAC,,, | Performed by: INTERNAL MEDICINE

## 2020-01-27 PROCEDURE — 99999 PR PBB SHADOW E&M-EST. PATIENT-LVL III: ICD-10-PCS | Mod: PBBFAC,,, | Performed by: INTERNAL MEDICINE

## 2020-01-27 PROCEDURE — 99213 OFFICE O/P EST LOW 20 MIN: CPT | Mod: S$GLB,,, | Performed by: INTERNAL MEDICINE

## 2020-01-27 PROCEDURE — 99213 PR OFFICE/OUTPT VISIT, EST, LEVL III, 20-29 MIN: ICD-10-PCS | Mod: S$GLB,,, | Performed by: INTERNAL MEDICINE

## 2020-01-27 NOTE — PROGRESS NOTES
Patient ID: Daija Traylor     Chief Complaint:   Chief Complaint   Patient presents with    1 month office return        HPI: Follow up for diabetes mellitus after we started Metformin 500 mg twice daily- tolerating it well but home glucoses not yet at goal. Cont same dose due to feeling jittery at 130. Complains of fatigue and insomnia. Declines Sleep Study. Will try Low dose Clonazepam and monitor labs. Previously on Provigil and felt better but will save this as a second level med.     Review of Systems   Constitutional: Positive for fatigue.   HENT: Negative.    Eyes: Negative.    Respiratory: Negative.    Cardiovascular: Negative.  Negative for chest pain.   Gastrointestinal: Negative.    Endocrine: Negative.  Negative for polydipsia, polyphagia and polyuria.   Genitourinary: Negative.    Musculoskeletal: Negative.    Skin: Negative.  Negative for pallor.   Allergic/Immunologic: Negative.    Neurological: Negative.  Negative for dizziness, tremors, seizures, speech difficulty, weakness and headaches.   Hematological: Negative.    Psychiatric/Behavioral: Negative.  Negative for confusion. The patient is not nervous/anxious.           Objective:      Physical Exam   Physical Exam   Constitutional: She is oriented to person, place, and time. She appears well-developed and well-nourished.   HENT:   Head: Normocephalic and atraumatic.   Nose: Nose normal.   Mouth/Throat: Oropharynx is clear and moist.   Eyes: Pupils are equal, round, and reactive to light. Conjunctivae and EOM are normal.   Neck: Normal range of motion. Neck supple.   Cardiovascular: Normal rate, regular rhythm, normal heart sounds and intact distal pulses.   Pulmonary/Chest: Effort normal and breath sounds normal.   Abdominal: Soft. Bowel sounds are normal.   Musculoskeletal: Normal range of motion.   Neurological: She is alert and oriented to person, place, and time.   Skin: Skin is warm and dry. Capillary refill takes less than 2 seconds.  "  Psychiatric: She has a normal mood and affect. Her behavior is normal. Judgment and thought content normal.   Nursing note and vitals reviewed.      Current Outpatient Medications:     aspirin (ECOTRIN) 81 MG EC tablet, Take 81 mg by mouth once daily., Disp: , Rfl:     atorvastatin (LIPITOR) 40 MG tablet, Take 1 tablet (40 mg total) by mouth every evening., Disp: 90 tablet, Rfl: 3    blood sugar diagnostic Strp, To check BG 2 times daily, to use with insurance preferred meter, Disp: 90 strip, Rfl: 3    blood-glucose meter kit, To check BG 2 times daily, to use with insurance preferred meter, Disp: 1 each, Rfl: 0    BYSTOLIC 10 mg Tab, TAKE ONE TABLET BY MOUTH DAILY, Disp: 90 tablet, Rfl: 1    DULoxetine (CYMBALTA) 60 MG capsule, TAKE 1 CAPSULE BY MOUTH EVERY DAY AS DIRECTED, Disp: 90 capsule, Rfl: 3    fluticasone furoate-vilanterol (BREO ELLIPTA) 200-25 mcg/dose DsDv diskus inhaler, Inhale 1 puff into the lungs once daily., Disp: 60 each, Rfl: 3    lancets Misc, To check BG 2 times daily, to use with insurance preferred meter, Disp: 90 each, Rfl: 3    losartan (COZAAR) 50 MG tablet, Take 1 tablet (50 mg total) by mouth once daily., Disp: 90 tablet, Rfl: 3    metFORMIN (GLUCOPHAGE) 500 MG tablet, Take 1 tablet (500 mg total) by mouth 2 (two) times daily with meals., Disp: 60 tablet, Rfl: 3         Vitals:   Vitals:    01/27/20 1648   BP: 132/76   Pulse: 67   Temp: 98.6 °F (37 °C)   SpO2: 95%   Weight: 85.6 kg (188 lb 11.4 oz)   Height: 5' 3" (1.6 m)      Assessment:       Patient Active Problem List    Diagnosis Date Noted    Chronic fatigue 01/27/2020    Type 2 diabetes mellitus without complication, without long-term current use of insulin 12/26/2019    Laceration of left hand without foreign body 12/26/2019    Lesion of parotid gland 12/26/2019    Hypercholesteremia     Impaired fasting glucose     Biliary dyskinesia 02/11/2019    Gastroesophageal reflux 12/12/2018    Essential hypertension " 03/14/2018    Coronary artery disease involving native coronary artery of native heart without angina pectoris 03/14/2018    Coronary artery disease 03/01/2018    Hyperlipidemia 04/05/2016    Chronic obstructive pulmonary disease 04/05/2016          Plan:       Daija was seen today for 1 month office return.    Diagnoses and all orders for this visit:    Type 2 diabetes mellitus without complication, without long-term current use of insulin  Monitor on Metformin     Chronic fatigue  -     Vitamin B12; Future  -     Vitamin D; Future    Lesion of parotid gland  Monitor     Other hyperlipidemia  Monitor     Essential hypertension  Controlled     Chronic obstructive pulmonary disease, unspecified COPD type  Stable      Insomnia  Clonazepam 0.5 mg / night       Answers for HPI/ROS submitted by the patient on 1/27/2020   Diabetes problem  MedicAlert ID: No  Disease duration: 1 months  blurred vision: No  foot paresthesias: No  foot ulcerations: No  visual change: No  weight loss: No  Symptom course: stable  hunger: No  mood changes: No  sleepiness: Yes  sweats: Yes  blackouts: No  hospitalization: No  nocturnal hypoglycemia: No  required assistance: No  required glucagon: No  CVA: No  heart disease: No  impotence: No  nephropathy: No  peripheral neuropathy: No  PVD: No  retinopathy: No  autonomic neuropathy: No  CAD risks: dyslipidemia, family history, hypertension, obesity, stress, tobacco exposure  Current treatments: oral agent (monotherapy)  Treatment compliance: most of the time  Dose schedule: pre-breakfast, at bedtime  Given by: patient  Home blood tests: 1-2 x per day  Monitoring compliance: good  Blood glucose trend: fluctuating minimally  Weight trend: stable  Current diet: generally healthy, low salt  Meal planning: avoidance of concentrated sweets  Exercise: rarely  Dietitian visit: No  Eye exam current: Yes  Sees podiatrist: No

## 2020-01-28 ENCOUNTER — TELEPHONE (OUTPATIENT)
Dept: FAMILY MEDICINE | Facility: CLINIC | Age: 65
End: 2020-01-28

## 2020-01-28 RX ORDER — CLONAZEPAM 0.5 MG/1
0.5 TABLET ORAL NIGHTLY
Qty: 30 TABLET | Refills: 5 | Status: SHIPPED | OUTPATIENT
Start: 2020-01-28 | End: 2021-05-11

## 2020-01-28 NOTE — TELEPHONE ENCOUNTER
Patient was wondering if she was getting something to help her sleep. She thought it would be called in on yesterday after her visit. Also wants to know if she should have her blood work done before she come back or now. Please advise

## 2020-01-28 NOTE — TELEPHONE ENCOUNTER
----- Message from Jenny Garcia sent at 1/28/2020  4:17 PM CST -----  Contact: self [phone]  .Type: Patient Call Back    Who called: self     What is the request in detail: Pt stated that she was seen on yesterday and was suppose to get a rx called in     Can the clinic reply by [facility]? Call back     Would the patient rather a call back or a response via [facility]?  Call back     Best call back number: [phone]

## 2020-01-29 NOTE — TELEPHONE ENCOUNTER
I am sorry that I forgot to send it to the pharmacy but I sent Rx for Clonazepam 0.5 mg in just now.

## 2020-01-30 ENCOUNTER — DOCUMENTATION ONLY (OUTPATIENT)
Dept: FAMILY MEDICINE | Facility: CLINIC | Age: 65
End: 2020-01-30

## 2020-01-30 NOTE — PROGRESS NOTES
PA:    Adderrall XR 25 mg Capsule    [facility] key:   [id number]  Case ID:    [id number]  [employer]  ----------------------------    Determination:     APPROVED  Valid:

## 2020-01-31 ENCOUNTER — LAB VISIT (OUTPATIENT)
Dept: LAB | Facility: HOSPITAL | Age: 65
End: 2020-01-31
Attending: INTERNAL MEDICINE
Payer: COMMERCIAL

## 2020-01-31 DIAGNOSIS — R53.82 CHRONIC FATIGUE: ICD-10-CM

## 2020-01-31 PROCEDURE — 82607 VITAMIN B-12: CPT

## 2020-01-31 PROCEDURE — 36415 COLL VENOUS BLD VENIPUNCTURE: CPT | Mod: PO

## 2020-01-31 PROCEDURE — 82306 VITAMIN D 25 HYDROXY: CPT

## 2020-02-01 LAB
25(OH)D3+25(OH)D2 SERPL-MCNC: 14 NG/ML (ref 30–96)
VIT B12 SERPL-MCNC: 355 PG/ML (ref 210–950)

## 2020-02-04 ENCOUNTER — TELEPHONE (OUTPATIENT)
Dept: FAMILY MEDICINE | Facility: CLINIC | Age: 65
End: 2020-02-04

## 2020-02-04 ENCOUNTER — PATIENT MESSAGE (OUTPATIENT)
Dept: FAMILY MEDICINE | Facility: CLINIC | Age: 65
End: 2020-02-04

## 2020-02-04 NOTE — TELEPHONE ENCOUNTER
----- Message from Merissa Love sent at 2/4/2020 10:10 AM CST -----  Contact: patient  Type:  Test Results    Who Called:  patient  Name of Test (Lab/Mammo/Etc):  Vitamin D and Vitamin B12  Date of Test:  01/31/2020  Ordering Provider:  Dr. Gold  Where the test was performed:    Best Call Back Number:  [phone]  Additional Information:  Patient has additional questions regarding the results of her tests

## 2020-02-04 NOTE — TELEPHONE ENCOUNTER
There is no shot for Vit D. If you're not absorbing B12 from the pill the next labs will show it and then you will qualify for the shot.

## 2020-02-04 NOTE — TELEPHONE ENCOUNTER
Pt returned the call and would like to know if you could send a prescription in for the Vit D and she also wanted to know if you if she could receive B-12 shots instead of taking it otc. Please advise.

## 2020-03-11 PROBLEM — Z72.0 TOBACCO ABUSE: Status: ACTIVE | Noted: 2020-03-11

## 2020-03-16 DIAGNOSIS — J44.9 CHRONIC OBSTRUCTIVE PULMONARY DISEASE, UNSPECIFIED COPD TYPE: ICD-10-CM

## 2020-03-16 RX ORDER — FLUTICASONE FUROATE AND VILANTEROL 200; 25 UG/1; UG/1
POWDER RESPIRATORY (INHALATION)
Qty: 60 EACH | Refills: 2 | Status: SHIPPED | OUTPATIENT
Start: 2020-03-16 | End: 2020-07-07

## 2020-03-19 ENCOUNTER — OFFICE VISIT (OUTPATIENT)
Dept: FAMILY MEDICINE | Facility: CLINIC | Age: 65
End: 2020-03-19
Payer: COMMERCIAL

## 2020-03-19 ENCOUNTER — TELEPHONE (OUTPATIENT)
Dept: FAMILY MEDICINE | Facility: CLINIC | Age: 65
End: 2020-03-19

## 2020-03-19 VITALS
SYSTOLIC BLOOD PRESSURE: 136 MMHG | OXYGEN SATURATION: 98 % | HEART RATE: 76 BPM | WEIGHT: 185 LBS | TEMPERATURE: 98 F | DIASTOLIC BLOOD PRESSURE: 90 MMHG | BODY MASS INDEX: 29.73 KG/M2 | HEIGHT: 66 IN

## 2020-03-19 DIAGNOSIS — J01.40 ACUTE NON-RECURRENT PANSINUSITIS: Primary | ICD-10-CM

## 2020-03-19 DIAGNOSIS — I10 ESSENTIAL HYPERTENSION: ICD-10-CM

## 2020-03-19 DIAGNOSIS — J43.2 CENTRILOBULAR EMPHYSEMA: ICD-10-CM

## 2020-03-19 DIAGNOSIS — E11.9 TYPE 2 DIABETES MELLITUS WITHOUT COMPLICATION, WITHOUT LONG-TERM CURRENT USE OF INSULIN: ICD-10-CM

## 2020-03-19 PROCEDURE — 99214 OFFICE O/P EST MOD 30 MIN: CPT | Mod: 25,S$GLB,, | Performed by: INTERNAL MEDICINE

## 2020-03-19 PROCEDURE — 2024F PR 7 FIELD PHOTOS WITH INTERP/ REVIEW: ICD-10-PCS | Mod: S$GLB,,, | Performed by: INTERNAL MEDICINE

## 2020-03-19 PROCEDURE — 99214 PR OFFICE/OUTPT VISIT, EST, LEVL IV, 30-39 MIN: ICD-10-PCS | Mod: 25,S$GLB,, | Performed by: INTERNAL MEDICINE

## 2020-03-19 PROCEDURE — 2024F 7 FLD RTA PHOTO EVC RTNOPTHY: CPT | Mod: S$GLB,,, | Performed by: INTERNAL MEDICINE

## 2020-03-19 PROCEDURE — 99999 PR PBB SHADOW E&M-EST. PATIENT-LVL III: CPT | Mod: PBBFAC,,, | Performed by: INTERNAL MEDICINE

## 2020-03-19 PROCEDURE — 99999 PR PBB SHADOW E&M-EST. PATIENT-LVL III: ICD-10-PCS | Mod: PBBFAC,,, | Performed by: INTERNAL MEDICINE

## 2020-03-19 PROCEDURE — 96372 THER/PROPH/DIAG INJ SC/IM: CPT | Mod: S$GLB,,, | Performed by: INTERNAL MEDICINE

## 2020-03-19 PROCEDURE — 96372 PR INJECTION,THERAP/PROPH/DIAG2ST, IM OR SUBCUT: ICD-10-PCS | Mod: S$GLB,,, | Performed by: INTERNAL MEDICINE

## 2020-03-19 RX ORDER — TRIAMCINOLONE ACETONIDE 40 MG/ML
80 INJECTION, SUSPENSION INTRA-ARTICULAR; INTRAMUSCULAR ONCE
Status: COMPLETED | OUTPATIENT
Start: 2020-03-19 | End: 2020-03-19

## 2020-03-19 RX ORDER — AMOXICILLIN AND CLAVULANATE POTASSIUM 875; 125 MG/1; MG/1
1 TABLET, FILM COATED ORAL EVERY 12 HOURS
Qty: 14 TABLET | Refills: 0 | Status: SHIPPED | OUTPATIENT
Start: 2020-03-19 | End: 2020-03-26

## 2020-03-19 RX ORDER — ALBUTEROL SULFATE 90 UG/1
2 AEROSOL, METERED RESPIRATORY (INHALATION) EVERY 4 HOURS PRN
Qty: 18 G | Refills: 3 | Status: SHIPPED | OUTPATIENT
Start: 2020-03-19 | End: 2020-09-23 | Stop reason: SDUPTHER

## 2020-03-19 RX ADMIN — TRIAMCINOLONE ACETONIDE 80 MG: 40 INJECTION, SUSPENSION INTRA-ARTICULAR; INTRAMUSCULAR at 10:03

## 2020-03-19 NOTE — PROGRESS NOTES
Patient ID: Daija Traylor     Chief Complaint:   Chief Complaint   Patient presents with    Sneezing    Cough    Nasal Congestion        HPI: Complains of symptoms x 3 days: sneezing, nasal congestion, sore throat from Post Nasal Drip, Cough w/ clear phlegm now. No fevers/ chills. Some shortness of breath but no wheezing.  Had 2 stents by Dr. Alvares 2 weeks ago and is doing well since then.     Review of Systems   Constitutional: Negative.    HENT: Positive for congestion, postnasal drip, sinus pressure and sore throat.    Eyes: Negative.    Respiratory: Positive for cough and shortness of breath.    Cardiovascular: Negative.    Gastrointestinal: Negative.    Endocrine: Negative.    Genitourinary: Negative.    Musculoskeletal: Negative.    Skin: Negative.    Allergic/Immunologic: Negative.    Neurological: Negative.    Hematological: Negative.    Psychiatric/Behavioral: Negative.           Objective:      Physical Exam   Physical Exam   Constitutional: She is oriented to person, place, and time. She appears well-developed and well-nourished.   Looks mildly ill   HENT:   Head: Normocephalic and atraumatic.   Nose: Nose normal.   Mouth/Throat: Oropharynx is clear and moist.   Nasal congestion   Mild Oropharyngeal erythema    Eyes: Pupils are equal, round, and reactive to light. Conjunctivae and EOM are normal.   Neck: Normal range of motion. Neck supple.   Cardiovascular: Normal rate, regular rhythm, normal heart sounds and intact distal pulses.   Pulmonary/Chest: Effort normal and breath sounds normal.   Good air movement, not tight, no wheezes    Abdominal: Soft. Bowel sounds are normal.   Musculoskeletal: Normal range of motion.   Neurological: She is alert and oriented to person, place, and time.   Skin: Skin is warm and dry. Capillary refill takes less than 2 seconds.   Psychiatric: She has a normal mood and affect. Her behavior is normal. Judgment and thought content normal.   Nursing note and vitals  reviewed.      Current Outpatient Medications:     aspirin (ECOTRIN) 81 MG EC tablet, Take 81 mg by mouth once daily., Disp: , Rfl:     atorvastatin (LIPITOR) 40 MG tablet, Take 1 tablet (40 mg total) by mouth every evening. (Patient taking differently: Take 40 mg by mouth once daily. ), Disp: 90 tablet, Rfl: 3    blood sugar diagnostic Strp, To check BG 2 times daily, to use with insurance preferred meter, Disp: 90 strip, Rfl: 3    blood-glucose meter kit, To check BG 2 times daily, to use with insurance preferred meter, Disp: 1 each, Rfl: 0    BYSTOLIC 10 mg Tab, TAKE ONE TABLET BY MOUTH DAILY, Disp: 90 tablet, Rfl: 1    clonazePAM (KLONOPIN) 0.5 MG tablet, Take 1 tablet (0.5 mg total) by mouth every evening., Disp: 30 tablet, Rfl: 5    clopidogreL (PLAVIX) 75 mg tablet, Take 1 tablet (75 mg total) by mouth once daily., Disp: 90 tablet, Rfl: 3    cyanocobalamin (VITAMIN B-12) 1000 MCG tablet, Take 100 mcg by mouth once daily., Disp: , Rfl:     DULoxetine (CYMBALTA) 60 MG capsule, TAKE 1 CAPSULE BY MOUTH EVERY DAY AS DIRECTED, Disp: 90 capsule, Rfl: 3    fluticasone furoate-vilanteroL (BREO) 200-25 mcg/dose DsDv diskus inhaler, INHALE ONE PUFF BY MOUTH DAILY, Disp: 60 each, Rfl: 2    isosorbide mononitrate (IMDUR) 30 MG 24 hr tablet, Take 1 tablet (30 mg total) by mouth once daily., Disp: 30 tablet, Rfl: 3    lancets Misc, To check BG 2 times daily, to use with insurance preferred meter, Disp: 90 each, Rfl: 3    losartan (COZAAR) 50 MG tablet, Take 1 tablet (50 mg total) by mouth once daily., Disp: 90 tablet, Rfl: 3    metFORMIN (GLUCOPHAGE) 500 MG tablet, Take 1 tablet (500 mg total) by mouth 2 (two) times daily with meals., Disp: 60 tablet, Rfl: 3    vitamin D (VITAMIN D3) 1000 units Tab, Take 2,000 Units by mouth once daily., Disp: , Rfl:     albuterol (VENTOLIN HFA) 90 mcg/actuation inhaler, Inhale 2 puffs into the lungs every 4 (four) hours as needed for Wheezing or Shortness of Breath. Rescue,  "Disp: 18 g, Rfl: 3    amoxicillin-clavulanate 875-125mg (AUGMENTIN) 875-125 mg per tablet, Take 1 tablet by mouth every 12 (twelve) hours. for 7 days, Disp: 14 tablet, Rfl: 0    Current Facility-Administered Medications:     triamcinolone acetonide injection 80 mg, 80 mg, Intramuscular, Once, Fahad Gold MD         Vitals:   Vitals:    03/19/20 0948   BP: (!) 136/90   Pulse: 76   Temp: 98.4 °F (36.9 °C)   TempSrc: Temporal   SpO2: 98%   Weight: 83.9 kg (185 lb)   Height: 5' 6" (1.676 m)      Assessment:       Patient Active Problem List    Diagnosis Date Noted    Tobacco abuse 03/11/2020    Chronic fatigue 01/27/2020    Type 2 diabetes mellitus without complication, without long-term current use of insulin 12/26/2019    Laceration of left hand without foreign body 12/26/2019    Lesion of parotid gland 12/26/2019    Hypercholesteremia     Impaired fasting glucose     Biliary dyskinesia 02/11/2019    Gastroesophageal reflux 12/12/2018    Essential hypertension 03/14/2018    Coronary artery disease involving native coronary artery of native heart without angina pectoris 03/14/2018    Coronary artery disease 03/01/2018    Hyperlipidemia 04/05/2016    Chronic obstructive pulmonary disease 04/05/2016          Plan:       Daija Traylor  was seen today for follow-up and may need lab work.    Diagnoses and all orders for this visit:    Daija was seen today for sneezing, cough and nasal congestion.    Diagnoses and all orders for this visit:    Acute non-recurrent pansinusitis  -     amoxicillin-clavulanate 875-125mg (AUGMENTIN) 875-125 mg per tablet; Take 1 tablet by mouth every 12 (twelve) hours. for 7 days  -     triamcinolone acetonide injection 80 mg    Centrilobular emphysema  -     albuterol (VENTOLIN HFA) 90 mcg/actuation inhaler; Inhale 2 puffs into the lungs every 4 (four) hours as needed for Wheezing or Shortness of Breath. Rescue  No need for oral steroids now     Essential " hypertension  Controlled     Type 2 diabetes mellitus without complication, without long-term current use of insulin  Monitor

## 2020-03-19 NOTE — TELEPHONE ENCOUNTER
----- Message from Monica Mancera sent at 3/19/2020  8:56 AM CDT -----  Contact: Daija pt  Type:  Same Day Appointment Request    Caller is requesting a same day appointment.  Caller declined first available appointment listed below.      Name of Caller:  Daija  When is the first available appointment?  n/a  Symptoms:  Cough, sneezing, nasal congestion, unk if she has fever  Best Call Back Number:  [phone]  Additional Information:   Pls call pt regarding an appt/she does not want a virtual visit. She is requesting to come in for a shot

## 2020-03-19 NOTE — TELEPHONE ENCOUNTER
Complains of sneezing, nasal congestion, cough, sore throat, states no fever although she did not take temperature    Patient refused video visit is asking to get steroid injection

## 2020-03-23 ENCOUNTER — TELEPHONE (OUTPATIENT)
Dept: FAMILY MEDICINE | Facility: CLINIC | Age: 65
End: 2020-03-23

## 2020-03-23 DIAGNOSIS — I10 HYPERTENSION, UNSPECIFIED TYPE: ICD-10-CM

## 2020-03-23 DIAGNOSIS — I10 ESSENTIAL HYPERTENSION: Primary | ICD-10-CM

## 2020-03-23 NOTE — TELEPHONE ENCOUNTER
----- Message from Blanca Dillon sent at 3/23/2020  4:35 PM CDT -----  Contact: Pt  Type:  Pharmacy Calling to Clarify an RX    Name of Caller:  Anne  Pharmacy Name:  [facility]  Prescription Name:  losartan (COZAAR) 50 MG tablet  What do they need to clarify?:  Not available in 50 mg, available in 25 mg and 100mg  Best Call Back Number: [phone]        Please advise. Thank you

## 2020-03-24 ENCOUNTER — TELEPHONE (OUTPATIENT)
Dept: FAMILY MEDICINE | Facility: CLINIC | Age: 65
End: 2020-03-24

## 2020-03-24 DIAGNOSIS — I10 ESSENTIAL HYPERTENSION: ICD-10-CM

## 2020-03-24 DIAGNOSIS — J01.40 ACUTE NON-RECURRENT PANSINUSITIS: Primary | ICD-10-CM

## 2020-03-24 RX ORDER — VALSARTAN 80 MG/1
80 TABLET ORAL DAILY
Qty: 90 TABLET | Refills: 3 | Status: SHIPPED | OUTPATIENT
Start: 2020-03-24 | End: 2024-01-15

## 2020-03-24 RX ORDER — LOSARTAN POTASSIUM 25 MG/1
50 TABLET ORAL DAILY
Qty: 180 TABLET | Refills: 3 | Status: SHIPPED | OUTPATIENT
Start: 2020-03-24 | End: 2020-03-24 | Stop reason: RX

## 2020-03-24 NOTE — TELEPHONE ENCOUNTER
----- Message from Emiliano KRAMER Chasity sent at 3/24/2020 10:22 AM CDT -----  Contact: [facility]  [facility] called in regarding patients Rx for losartan (COZAAR) 25 MG tablet.  Joy stated this is on backorder.  They have Valsartan 40 or 80 mgs or Omesartan 20 or 40 mg      [id number] [place] - [address]  [place] [address]  Phone: [phone] Fax: [phone]

## 2020-03-26 ENCOUNTER — TELEPHONE (OUTPATIENT)
Dept: FAMILY MEDICINE | Facility: CLINIC | Age: 65
End: 2020-03-26

## 2020-03-26 DIAGNOSIS — J43.2 CENTRILOBULAR EMPHYSEMA: Primary | ICD-10-CM

## 2020-03-26 RX ORDER — PREDNISONE 10 MG/1
10 TABLET ORAL DAILY
Qty: 28 TABLET | Refills: 0 | Status: SHIPPED | OUTPATIENT
Start: 2020-03-26 | End: 2020-04-29 | Stop reason: SDUPTHER

## 2020-03-26 NOTE — TELEPHONE ENCOUNTER
----- Message from Suzy Mcgill sent at 3/26/2020 11:42 AM CDT -----  Contact: patient  Type: Needs Medical Advice    Who Called:  patient  Symptoms (please be specific):  Congested and sneezing  How long has patient had these symptoms:  1 week  Pharm[facility] name and phone #:  [facility]  Rell Call Back Number: [phone] (home)   Additional Information: the patient said she feels she still has a sinus infection may need another rx please call her to advise

## 2020-03-26 NOTE — TELEPHONE ENCOUNTER
----- Message from Jonathan Larkin sent at 3/26/2020 11:55 AM CDT -----  Contact: patient  Type: Needs Medical Advice    Who Called:  patient  Symptoms (please be specific):    How long has patient had these symptoms:    Pharmacy name and phone #:  [facility]   Best Call Back Number:   Additional Information: called to advise of pharmacy where to send medication.this is a follow up to a previous message that was sent today.

## 2020-03-26 NOTE — TELEPHONE ENCOUNTER
Callback to patient--still complains of sneezing and congestion, worsening sometimes productive cough    Please advise

## 2020-03-26 NOTE — TELEPHONE ENCOUNTER
I spoke to the patient regarding her recurrence of symptoms.  Seven days ago she presented with congestion, cough with clear phlegm, sneezing but no fevers or chills. She did have a scratchy throat from postnasal drip.  I treated her with Augmentin and give her shot of steroids and a albuterol rescue inhaler.  She notes that her symptoms were slowly improving but now that the antibiotics have finished she had a rebound in her symptoms.  Today she started feeling worse: more cough and congestion.  She did cough during the telephone interview and she did sound quite wheezy.  She notes no worsened shortness of breath but she does have some wheezes on occasion.  I will treat with a prednisone taper at this time to hopefully avoid antibiotics.  I asked her to give me a call in a few days if she is not feeling better.

## 2020-04-23 DIAGNOSIS — E11.9 TYPE 2 DIABETES MELLITUS WITHOUT COMPLICATION, WITHOUT LONG-TERM CURRENT USE OF INSULIN: ICD-10-CM

## 2020-04-23 RX ORDER — METFORMIN HYDROCHLORIDE 500 MG/1
TABLET ORAL
Qty: 180 TABLET | Refills: 3 | Status: SHIPPED | OUTPATIENT
Start: 2020-04-23 | End: 2021-06-02

## 2020-04-28 ENCOUNTER — TELEPHONE (OUTPATIENT)
Dept: FAMILY MEDICINE | Facility: CLINIC | Age: 65
End: 2020-04-28

## 2020-04-28 DIAGNOSIS — J43.2 CENTRILOBULAR EMPHYSEMA: ICD-10-CM

## 2020-04-28 DIAGNOSIS — J01.40 ACUTE NON-RECURRENT PANSINUSITIS: Primary | ICD-10-CM

## 2020-04-28 NOTE — TELEPHONE ENCOUNTER
Patient complains of having sinus sx again, cough with green phlegm--denies other sx asking for something to help nip it in the bud    Patient states she is losing her insurance due to being laid off right now

## 2020-04-28 NOTE — TELEPHONE ENCOUNTER
----- Message from Rona Lopez sent at 4/28/2020 12:39 PM CDT -----  Pt can be reached at [phone]    Pt is calling to request medication for sinus sent to pharmacy. Pt will not have insurance in a couple of days.      [facility]  Pharmacy in [place] on [address]  phone [phone]    Thank you!

## 2020-04-29 RX ORDER — PREDNISONE 10 MG/1
10 TABLET ORAL DAILY
Qty: 28 TABLET | Refills: 0 | Status: SHIPPED | OUTPATIENT
Start: 2020-04-29 | End: 2020-08-18

## 2020-04-29 RX ORDER — AMOXICILLIN AND CLAVULANATE POTASSIUM 875; 125 MG/1; MG/1
1 TABLET, FILM COATED ORAL EVERY 12 HOURS
Qty: 14 TABLET | Refills: 0 | Status: SHIPPED | OUTPATIENT
Start: 2020-04-29 | End: 2020-05-06

## 2020-04-29 NOTE — TELEPHONE ENCOUNTER
Advised pt of message below. Pt verbalized understanding and states that she is having some wheezing.

## 2020-04-29 NOTE — TELEPHONE ENCOUNTER
I am sorry for her insurance issues.  I sent in a prescription for Augmentin since this has worked for her in the past.  Does she think she needs steroids as well?  Indication for those are shortness of breath, wheezing, chest tightness, etc..

## 2020-05-05 ENCOUNTER — PATIENT MESSAGE (OUTPATIENT)
Dept: ADMINISTRATIVE | Facility: HOSPITAL | Age: 65
End: 2020-05-05

## 2020-06-26 ENCOUNTER — DOCUMENTATION ONLY (OUTPATIENT)
Dept: FAMILY MEDICINE | Facility: CLINIC | Age: 65
End: 2020-06-26

## 2020-06-26 DIAGNOSIS — E11.9 TYPE 2 DIABETES MELLITUS WITHOUT COMPLICATION: ICD-10-CM

## 2020-06-26 NOTE — PROGRESS NOTES
PA:   Breo Ellipta 200-25 mcg/inh Aerosol Powder    CMM key:  [id number]  Case ID:   [id number]  [employer]  ----------------------------  DENIED  Must try alternatives per criteria  (at least one): Pi\ulmicort Respules, Symbicort, Flovent HFA, Advair HFA, Asmanex Twisthaler, Dulera

## 2020-07-02 ENCOUNTER — TELEPHONE (OUTPATIENT)
Dept: FAMILY MEDICINE | Facility: CLINIC | Age: 65
End: 2020-07-02

## 2020-07-02 NOTE — TELEPHONE ENCOUNTER
----- Message from Monica Schmidt sent at 7/2/2020 10:56 AM CDT -----  Regarding: pt advice  Pt needs to see about a prior auth ([id number]) sent insurance for medication for the following    BYSTOLIC 10 mg Tab 90 tablet 1 8/1/2019  No  Sig: TAKE ONE TABLET BY MOUTH DAILY  Sent to pharmacy as: BYSTOLIC 10 mg Tab  Class: Normal    Insurance will not cover the medication below please advise pt       fluticasone furoate-vilanteroL (BREO) 200-25 mcg/dose DsDv diskus inhaler 60 each 2 3/16/2020  No  Sig: INHALE ONE PUFF BY MOUTH DAILY  Sent to pharmacy as: fluticasone furoate-vilanteroL (BREO) 200-25 mcg/dose DsDv diskus inhaler    Karen [phone]      Pt can be reached [phone]

## 2020-07-06 ENCOUNTER — PATIENT MESSAGE (OUTPATIENT)
Dept: FAMILY MEDICINE | Facility: CLINIC | Age: 65
End: 2020-07-06

## 2020-07-06 DIAGNOSIS — I10 ESSENTIAL HYPERTENSION: ICD-10-CM

## 2020-07-06 DIAGNOSIS — J43.2 CENTRILOBULAR EMPHYSEMA: Primary | ICD-10-CM

## 2020-07-07 RX ORDER — FLUTICASONE PROPIONATE AND SALMETEROL XINAFOATE 230; 21 UG/1; UG/1
2 AEROSOL, METERED RESPIRATORY (INHALATION) 2 TIMES DAILY
Qty: 1 INHALER | Refills: 6 | Status: SHIPPED | OUTPATIENT
Start: 2020-07-07 | End: 2021-02-01

## 2020-07-07 RX ORDER — METOPROLOL SUCCINATE 50 MG/1
50 TABLET, EXTENDED RELEASE ORAL DAILY
Qty: 30 TABLET | Refills: 11 | Status: SHIPPED | OUTPATIENT
Start: 2020-07-07 | End: 2020-08-18

## 2020-07-07 NOTE — TELEPHONE ENCOUNTER
PA denied for Breo inhaler.   Preferred alternatives include   Symbicort  Flovent HFA  Advair HFA  Asmanex  Dulera   Please send alternative to pharmacy

## 2020-07-07 NOTE — TELEPHONE ENCOUNTER
Let's switch the Breo to Advair and Bystolic to Toprol XL. Let me know if they are too much $$$ anf I'll try something else.

## 2021-01-04 ENCOUNTER — PATIENT MESSAGE (OUTPATIENT)
Dept: ADMINISTRATIVE | Facility: HOSPITAL | Age: 66
End: 2021-01-04

## 2021-04-06 ENCOUNTER — PATIENT MESSAGE (OUTPATIENT)
Dept: ADMINISTRATIVE | Facility: HOSPITAL | Age: 66
End: 2021-04-06

## 2021-07-07 ENCOUNTER — PATIENT MESSAGE (OUTPATIENT)
Dept: ADMINISTRATIVE | Facility: HOSPITAL | Age: 66
End: 2021-07-07

## 2022-01-26 ENCOUNTER — PATIENT OUTREACH (OUTPATIENT)
Dept: ADMINISTRATIVE | Facility: HOSPITAL | Age: 67
End: 2022-01-26

## 2022-01-26 ENCOUNTER — PATIENT MESSAGE (OUTPATIENT)
Dept: ADMINISTRATIVE | Facility: HOSPITAL | Age: 67
End: 2022-01-26

## 2022-01-26 NOTE — PROGRESS NOTES
Gap report chart review completed for overdue diabetic eye exam  Care everywhere and Media reports reviewed.     Patient Outreach performed.

## 2022-05-31 ENCOUNTER — PATIENT MESSAGE (OUTPATIENT)
Dept: ADMINISTRATIVE | Facility: HOSPITAL | Age: 67
End: 2022-05-31

## 2023-07-26 ENCOUNTER — TELEPHONE (OUTPATIENT)
Dept: SMOKING CESSATION | Facility: CLINIC | Age: 68
End: 2023-07-26

## 2023-07-26 NOTE — TELEPHONE ENCOUNTER
Unsuccessful contact with patient regarding the tobacco cessation program. Left a message with contact information to schedule an appointment.

## 2023-09-05 PROBLEM — Z95.5 PRESENCE OF STENT IN LAD CORONARY ARTERY: Status: ACTIVE | Noted: 2023-09-05

## 2023-09-05 PROBLEM — Z95.5 S/P RIGHT CORONARY ARTERY (RCA) STENT PLACEMENT: Status: ACTIVE | Noted: 2023-09-05

## 2024-07-11 ENCOUNTER — PATIENT OUTREACH (OUTPATIENT)
Dept: ADMINISTRATIVE | Facility: HOSPITAL | Age: 69
End: 2024-07-11

## 2024-07-11 DIAGNOSIS — F17.210 CIGARETTE SMOKER: Primary | ICD-10-CM

## 2024-07-11 NOTE — PROGRESS NOTES
Population Health Chart Review & Patient Outreach Details      Additional [facility] Notes:               Updates Requested / Reviewed:      Updated Care Coordination Note         Health Maintenance Topics Overdue:      VBHM Score: 3     Hemoglobin A1c  Mammogram  LDCT Lung Screen    Pneumonia Vaccine  Shingles/Zoster Vaccine  RSV Vaccine                  Health Maintenance Topic(s) Outreach Outcomes & Actions Taken:    Low Dose CT Screening - Outreach Outcomes & Actions Taken  : Low Dose CT Order Placed

## 2025-02-05 ENCOUNTER — TELEPHONE (OUTPATIENT)
Dept: ORTHOPEDICS | Facility: CLINIC | Age: 70
End: 2025-02-05
Payer: MEDICARE

## 2025-02-05 DIAGNOSIS — M79.642 LEFT HAND PAIN: Primary | ICD-10-CM

## 2025-02-05 NOTE — TELEPHONE ENCOUNTER
----- Message from Julius Rivera sent at 2/4/2025  2:44 PM CST -----    ----- Message -----  From: Edda Vee  Sent: 2/4/2025   2:42 PM CST  To: Ratna Cunningham Staff    Rebekalo,       Patient contacted us advising that she would like to see Dr Segundo in his [facility] location. Patient sates she previously had surgery with him at his [facility] Location.

## 2025-02-12 ENCOUNTER — HOSPITAL ENCOUNTER (OUTPATIENT)
Dept: RADIOLOGY | Facility: HOSPITAL | Age: 70
Discharge: HOME | End: 2025-02-12
Attending: ORTHOPAEDIC SURGERY
Payer: MEDICARE

## 2025-02-12 ENCOUNTER — OFFICE VISIT (OUTPATIENT)
Dept: ORTHOPEDICS | Facility: CLINIC | Age: 70
End: 2025-02-12
Payer: MEDICARE

## 2025-02-12 DIAGNOSIS — M19.049 HAND ARTHRITIS: ICD-10-CM

## 2025-02-12 DIAGNOSIS — G56.01 RIGHT CARPAL TUNNEL SYNDROME: ICD-10-CM

## 2025-02-12 DIAGNOSIS — M79.642 LEFT HAND PAIN: ICD-10-CM

## 2025-02-12 DIAGNOSIS — M65.332 TRIGGER FINGER, LEFT MIDDLE FINGER: Primary | ICD-10-CM

## 2025-02-12 DIAGNOSIS — F17.218 CIGARETTE NICOTINE DEPENDENCE WITH OTHER NICOTINE-INDUCED DISORDER: ICD-10-CM

## 2025-02-12 PROBLEM — F17.210 DEPENDENCE ON NICOTINE FROM CIGARETTES: Status: ACTIVE | Noted: 2025-02-12

## 2025-02-12 PROCEDURE — 73130 X-RAY EXAM OF HAND: CPT | Mod: 26,LT,, | Performed by: RADIOLOGY

## 2025-02-12 PROCEDURE — 99999 PR PBB SHADOW E&M-EST. PATIENT-LVL III: CPT | Mod: PBBFAC,,, | Performed by: ORTHOPAEDIC SURGERY

## 2025-02-12 PROCEDURE — 73130 X-RAY EXAM OF HAND: CPT | Mod: TC,LT

## 2025-02-12 RX ORDER — DICLOFENAC SODIUM 10 MG/G
2 GEL TOPICAL DAILY
Qty: 100 G | Refills: 1 | Status: SHIPPED | OUTPATIENT
Start: 2025-02-12 | End: 2025-02-12

## 2025-02-12 RX ORDER — IBUPROFEN 200 MG
1 TABLET ORAL DAILY
Qty: 30 PATCH | Refills: 2 | Status: SHIPPED | OUTPATIENT
Start: 2025-02-12

## 2025-02-12 RX ORDER — CELECOXIB 100 MG/1
100 CAPSULE ORAL DAILY
Qty: 30 CAPSULE | Refills: 1 | Status: SHIPPED | OUTPATIENT
Start: 2025-02-12

## 2025-02-12 RX ORDER — BETAMETHASONE SODIUM PHOSPHATE AND BETAMETHASONE ACETATE 3; 3 MG/ML; MG/ML
3 INJECTION, SUSPENSION INTRA-ARTICULAR; INTRALESIONAL; INTRAMUSCULAR; SOFT TISSUE
Status: COMPLETED | OUTPATIENT
Start: 2025-02-12 | End: 2025-02-12

## 2025-02-12 RX ORDER — LIDOCAINE HYDROCHLORIDE 10 MG/ML
0.5 INJECTION, SOLUTION EPIDURAL; INFILTRATION; INTRACAUDAL; PERINEURAL
Status: COMPLETED | OUTPATIENT
Start: 2025-02-12 | End: 2025-02-12

## 2025-02-12 RX ORDER — DICLOFENAC SODIUM 10 MG/G
2 GEL TOPICAL 3 TIMES DAILY PRN
Qty: 100 G | Refills: 1 | Status: SHIPPED | OUTPATIENT
Start: 2025-02-12

## 2025-02-12 RX ADMIN — BETAMETHASONE SODIUM PHOSPHATE AND BETAMETHASONE ACETATE 3 MG: 3; 3 INJECTION, SUSPENSION INTRA-ARTICULAR; INTRALESIONAL; INTRAMUSCULAR; SOFT TISSUE at 09:02

## 2025-02-12 RX ADMIN — LIDOCAINE HYDROCHLORIDE 5 MG: 10 INJECTION, SOLUTION EPIDURAL; INFILTRATION; INTRACAUDAL; PERINEURAL at 09:02

## 2025-02-12 NOTE — PROGRESS NOTES
[facility]    02/12/2025  Daija Traylor    Chief complaint:   Chief Complaint   Patient presents with    Left Hand - Pain       Pain: 7/10    QuickDASH:  Not obtained today.    HPI: Daija is a 70yo RHD female w/ CAD s/p PCI on ASA 81, DM2 (last A1c 6.0) and current 1.5 ppd cigarette use who presents for evaluation of Left long finger pain triggering and locking.  I saw her for this about 9 months ago and my previous place of work.  We provided an injection at that time.  This seems to have worked well but she reports the locking started again about a month ago.  Pain is 7/10 today.  She reports she can not  objects and has a lot of difficulty because of this.  She has taken some anti-inflammatories but not sustained.  She has not tried bracing since this started again.    She also reports some diffuse bilateral hand pain which she describes as achiness in the morning.  This is not numbness however.  This is more chronic.    Hand dominance: R  Neck pain: N  Prior trauma to head/neck: N  Prior shoulder trauma: N  Prior elbow trauma: N  Prior wrist/hand trauma: N    Duration of symptoms:  10 months total with recurrence starting a month ago  Prior injections:  Yes x1 in May of 2024  Bracing:  No  Home or outpatient therapy:  Not applicable.  Anti-inflammatories: Yes  Other: Gabapentin    Hx/o CVA/TIAs: N  Personal or family history of demyelinating disease: N  Hx/o Vitamin deficiency and/or malnutrition: N  Metabolic abnormalities acquired/genetic: N  Personal of family hx/o Chiari malformations: N  Oncologic Hx (personal): N  Hx/o chemotherapy: N  Hx/o radiation: N  EtOH abuse: N  Hx/o diabetes 1/2:  Yes type 2   Last A1c:  6.0 11/2024   Distal neuropathy w/ S-W testing: Y/N  Tobacco use (incl Vape):  Yes 1.5 packs per day    Anticoagulation/antiplatelet:  Aspirin 81mg  Assistive device use:  None  Osteoporotic/low energy fracture history:  None.  Active steroid, chemotherapy,  or other immunomodulator use: None.   Personal or family history of RA or other autoimmune/inflammatory conditions: None.  Hypothyroidism:  No    Other relevant Orthopaedic hx:  Left thumb CMC arthroplasty.  Bilateral hip replacements.  Bilateral carpal tunnel releases.    CT-6 Score: 4/26 (Right)    Amyloidosis Screen:  Tier 1:   M>/=50:    F>/=60: +   Bilateral CT Sx or prior CTR: +  Tier 2:    Lumbar/cervical stenosis:    BTR:   Afib/flutter:   PM:   CHF:   FamHx ATTR amyloidosis:    ROS: No fevers, chills, nausea, vomiting, chest pain, shortness of breath, dizziness, abdominal pain, N/T/P beyond described in HPI.     Past Medical History:   Diagnosis Date    COPD (chronic obstructive pulmonary disease)     Coronary artery disease 03/2018    x1 stent    Depression     Hypercholesteremia     Hypertension     Impaired fasting glucose     Presence of stent in coronary artery     Type 2 diabetes mellitus without complications        Prior to Admission medications    Medication Sig Start Date End Date Taking? Authorizing Provider   ADVAIR -21 mcg/actuation HFAA inhaler INHALE 2 PUFFS INTO THE LUNGS BY MOUTH TWICE DAILY 2/1/21  Yes Fahad Gold MD   albuterol (PROVENTIL/VENTOLIN HFA) 90 mcg/actuation inhaler INHALE 1 TO 2 PUFFS BY MOUTH EVERY 4 HOURS AS NEEDED 10/6/21  Yes Miracle Sesay MD   aspirin (ECOTRIN) 81 MG EC tablet Take 81 mg by mouth once daily.   Yes Provider, Historical   atorvastatin (LIPITOR) 40 MG tablet Take 1 tablet (40 mg total) by mouth every evening. 1/14/20 2/12/25 Yes Chava Wright MD   bisoprolol (ZEBETA) 10 MG tablet Take 1 tablet (10 mg total) by mouth 2 (two) times a day. 11/18/22  Yes Chava Wright MD   DULoxetine (CYMBALTA) 60 MG capsule TAKE 1 CAPSULE BY MOUTH EVERY DAY AS DIRECTED 9/27/19  Yes Fahad Gold MD   metFORMIN (GLUCOPHAGE) 500 MG tablet TAKE 1 TABLET BY MOUTH TWICE DAILY WITH MEALS 6/2/21  Yes Fahad Gold MD   montelukast  (SINGULAIR) 10 mg tablet Take 10 mg by mouth. 8/15/23  Yes Provider, Historical   SPIRIVA WITH HANDIHALER 18 mcg inhalation capsule INL THE CONTENTS OF 1 C  PO ITL D 8/9/20  Yes Provider, Historical   valsartan (DIOVAN) 80 MG tablet Take 1 tablet (80 mg total) by mouth once daily. 3/24/20 2/12/25 Yes Fahad Gold MD   blood sugar diagnostic Strp To check BG 2 times daily, to use with insurance preferred meter 12/26/19   Fahad Gold MD   blood-glucose meter kit To check BG 2 times daily, to use with insurance preferred meter 12/26/19 12/25/20  Fahad Gold MD   celecoxib (CELEBREX) 100 MG capsule Take 1 capsule (100 mg total) by mouth once daily. 2/12/25   Octavio Segundo MD   cyanocobalamin (VITAMIN B-12) 1000 MCG tablet Take 100 mcg by mouth once daily.  Patient not taking: Reported on 2/12/2025    Provider, Historical   diclofenac sodium (VOLTAREN) 1 % Gel Apply 2 g topically 3 (three) times daily as needed (pain). 2/12/25   Octavio Segundo MD   gabapentin (NEURONTIN) 300 MG capsule Take 300 mg by mouth every evening.  Patient not taking: Reported on 2/12/2025 4/21/21   Provider, Historical   lancets Misc To check BG 2 times daily, to use with insurance preferred meter 12/26/19   Fahad Gold MD   nicotine (NICODERM CQ) 14 mg/24 hr Place 1 patch onto the skin once daily. 2/12/25   Octavio Segundo MD   TRULICITY 0.75 mg/0.5 mL pen injector Inject into the skin. 7/9/23   Provider, Historical   VICTOZA 2-SONY 0.6 mg/0.1 mL (18 mg/3 mL) PnIj pen ADM 0.6 MG SC D  Patient not taking: Reported on 2/12/2025 11/5/20   Provider, Historical   vitamin D (VITAMIN D3) 1000 units Tab Take 2,000 Units by mouth once daily.  Patient not taking: Reported on 2/12/2025    Provider, Historical   diclofenac sodium (VOLTAREN) 1 % Gel Apply 2 g topically once daily. 2/12/25 2/12/25  Octavio Segundo MD       Past Surgical History:   Procedure Laterality Date    ANTERIOR CRUCIATE LIGAMENT REPAIR      BACK SURGERY       BREAST BIOPSY      BREAST SURGERY      CARPAL TUNNEL RELEASE      CERVICAL FUSION      CHOLECYSTECTOMY      COLONOSCOPY N/A 12/12/2018    Procedure: COLONOSCOPY;  Surgeon: Jerry Cha MD;  Location: [facility] ENDO;  Service: Endoscopy;  Laterality: N/A;    CORONARY ANGIOGRAPHY N/A 03/04/2020    Procedure: ANGIOGRAM, CORONARY ARTERY;  Surgeon: Chava Alvares MD;  Location: [facility] CATH;  Service: Cardiovascular;  Laterality: N/A;    ESOPHAGOGASTRODUODENOSCOPY N/A 12/12/2018    Procedure: EGD (ESOPHAGOGASTRODUODENOSCOPY);  Surgeon: Jerry Cha MD;  Location: [facility] ENDO;  Service: Endoscopy;  Laterality: N/A;    JOINT REPLACEMENT      LAPAROSCOPIC CHOLECYSTECTOMY N/A 02/11/2019    Procedure: CHOLECYSTECTOMY-LAPAROSCOPIC;  Surgeon: Rome Arceo MD;  Location: [facility] OR;  Service: General;  Laterality: N/A;    LEFT HEART CATHETERIZATION Left 03/04/2020    Procedure: Left heart cath;  Surgeon: Chava Alvares MD;  Location: [facility] CATH;  Service: Cardiovascular;  Laterality: Left;    ROTATOR CUFF REPAIR Right     thumb Left     fusion    TOTAL HIP ARTHROPLASTY Bilateral        Family History   Problem Relation Name Age of Onset    Heart disease Mother      Heart disease Father      Diabetes Father         Social History     Socioeconomic History    Marital status:    Tobacco Use    Smoking status: Every Day     Current packs/day: 1.00     Average packs/day: 1 pack/day for 45.0 years (45.0 ttl pk-yrs)     Types: Cigarettes    Smokeless tobacco: Never   Substance and Sexual Activity    Alcohol use: Yes     Comment: 2 glasses wine every evening    Drug use: No    Sexual activity: Not Currently     Social Drivers of Health     Financial Resource Strain: Low Risk  (11/20/2024)    [facility]    Overall Financial Resource Strain (CARDIA)     Difficulty of Paying Living Expenses: Not hard at all   Food Insecurity: No Food  Insecurity (11/20/2024)    [facility]    Hunger Vital Sign     Worried About Running Out of Food in the Last Year: Never true     Ran Out of Food in the Last Year: Never true   Transportation Needs: No Transportation Needs (11/20/2024)    [facility]    PRAPARE - Transportation     Lack of Transportation (Medical): No     Lack of Transportation (Non-Medical): No   Physical Activity: Insufficiently Active (3/9/2022)    [facility], [facility]    Exercise Vital Sign     Days of Exercise per Week: 1 day     Minutes of Exercise per Session: 30 min   Stress: No Stress Concern Present (3/9/2022)    [facility], [facility]    [employer] [place] [employer] - Occupational Stress Questionnaire     Feeling of Stress : Not at all   Housing Stability: Low Risk  (11/20/2024)    [facility]    Housing Stability Vital Sign     Unable to Pay for Housing in the Last Year: No     Number of Times Moved in the Last Year: 0     Homeless in the Last Year: No       Review of patient's allergies indicates:  No Known Allergies      Physical Exam:     Patient is alert, well-appearing, and in no acute distress. Breathing comfortably. Extraocular muscles are intact. Pupils equal. Facial muscles symmetric. Voice with good intonation. No ptosis, anhidrosis, or miosis.      C-spine/Plexus/Shoulder  There is no discomfort with cervical flexion extension and  lateral rotation. No radicular/neuritic symptoms down either UE.   No cervical paraspinal tenderness to palpation.  Negative Marvin test although w/ pain on Left hand due to long TF.   No jugular engorgement or distal ischemia w/ shoulder ABD/ER.  Pulses are symmetric in abduction external rotation at the wrist and at the side.  Bilateral shoulder forward elevation is 140 and painless.  Internal rotation is to L4-5 and painless.  External rotation is 50-60 bilaterally and painless.  Tinel's interscalene/pec minor: NT.   No masses or fullness at neck/SC fossa.     Myelopathic signs:   Marina's: No  Reflexes   Tri: 1/4   Bi: 1/4   Br: 1/4    Cerebellar exam:  Craig:  Not tested  FTN:  Not tested  Dysdiadochokinesia:  Not tested     Distal BUE exam:  No resting or volitional tremors.   No skin changes or discoloration.  No wounds rashes or lesions.   No atrophy or fasciculations.  There was a well-healed carpal tunnel incision bilaterally as well as a well-healed left thumb CMC incision.    There is focal pain to palpation over the left long A1 pulley.  There was active triggering with the attempted flexion on exam.  There was crepitus over the A1 pulley with passive motion.  Dorsally there was no extensor tendon subluxation.  No palpable masses over the A1 pulley or the flexor sheath.  No pain to palpation over the adjacent A1 pulleys.    Elbow, wrist, and extrinsic digital motion is painless.   Pronosupination is full and painless.  No crepitus.  No clicks or mechanical blocks to motion.    Wrist flexion: 50 left  Wrist extension: 60 left  Radial dev: 10-15 left  Ulnar dev: 30 left    DRUJ: Stable and painless to manipulation in supination, pronation, and neutral.     TTP ulna fovea:  No  TTP ECU insertion:  No  TTP 6th DC at distal ulna:  No    Scaphoid shift:  Not tested  Midcarpal shift/manipulation:  Not tested    There is no fullness or any prominences along the supracondylar ridge of Left elbow.  No palpable  epitrochlear lymph nodes.  No pain or tenderness along the AIN/pronator interval.  No Tinel's along SRN.   No allodynia, no hyperpathia, no other signs of RSD/CRPS.   No pain over FCU.   No pain over FCR at wrist or proximally.   Medical epicondyle TTP: No.   Lateral epicondyle TTP: No.   Ulnar tunnel/hook of hamate: No pain or fullness to palpation. No pulsatile masses.   No pain over 1st dorsal compartment.     Thumb CMC  Pain at CMC/STT: No.   Left thumb CMC joint is stable to axial loading without crepitus.  Slight Z deformity.  First webspace adduction contracture?  No  Z-deformity and/or MCP hyperextension?  Yes although correctable.              Passively correctable?  Not applicable.  MCP flexion is at least 50°.    Kapandji score: 10/10    Sensation:  2 point discriminative touch is </= 5mm on both sides.  Slightly diminished accuracy in the left index finger but otherwise normal.  DBUN deficit: None.   SRN: deficit: None.     APB atrophy: No.   FDI atrophy: No  ADM atrophy: No.    APB: 4+/5 on Left.   FPB: 5/5  FDI: 5/5  ADM: 5/5    :  60% on left compared to right due to pain and long finger.  Key pinch:  90% on left compared to right.     Tinel's wrist:  Positive on right  Durkan's:  Negative  Phalen's:  Negative  Reverse Phalen's:  Negative     Tinel's elbow:  Negative  Elbow flexion test:  Negative  Ulnar nerve instability:  Negative    Wartenburg's:  Negative.  Froment's:  Not tested.    Palpable radial pulse at wrist. Fingers well-perfused with CR <2s. No swelling in digits.     Imaging:  AP, lateral, and oblique x-rays of the Left hand obtained today in clinic were reviewed and independently interpreted and demonstrate no acute displaced fractures, dislocations, or subluxations.  No significant MCP degenerative changes.  Mild IP joint degenerative changes in the small and index fingers worse at the PIP.  There are findings status post trapezium and partial trapezoid resection arthroplasty with  settling of the thumb metacarpal base although without any scaphoid metacarpal impingement.  Radiographic Z deformity is noted with adduction of the thumb metacarpal.  No interval arthrosis at the scaphoid metacarpal articulation or the scaphotrapezoid articulation.  No radiopaque foreign bodies or calcifications noted over the long finger.  No heterotopic ossification.    Labs:    Last A1c: 6.0 11/2024    Studies:  I did briefly review the previous bilateral upper extremity nerve test performed by Dr. Gordillo dated 08/29/2023 which does show both sensory and motor conduction parameters of right carpal tunnel syndrome based on AANEM criteria.  This is much worse on the left side and she is status post carpal tunnel release.  Findings on that side are non recordable sensory potentials with left median APB latency of 6.98.  No EMG changes.    CT-6 Score: 4/26 (Right)    Assessment:  1. Left long stenosing tenosynovitis with triggering celecoxib (CELEBREX) 100 MG capsule    diclofenac sodium (VOLTAREN) 1 % Gel    betamethasone acetate-betamethasone sodium phosphate injection 3 mg    LIDOcaine (PF) 10 mg/ml (1%) injection 5 mg    DISCONTINUED: diclofenac sodium (VOLTAREN) 1 % Gel      2. Hand arthritis  celecoxib (CELEBREX) 100 MG capsule    diclofenac sodium (VOLTAREN) 1 % Gel    DISCONTINUED: diclofenac sodium (VOLTAREN) 1 % Gel      3. Cigarette nicotine dependence with other nicotine-induced disorder  nicotine (NICODERM CQ) 14 mg/24 hr      4. Right carpal tunnel syndrome              Plan:  Daija is a 68yo RHD female with CAD status post PCI on ASA 81, DM2 (last A1c 6.0), and current 1.5ppd cigarette use who presents for evaluation of Left long finger pain and locking.  She underwent a previous injection for the same condition about 9 months ago.  This provided several months of relief.  Her symptoms returned about a month ago.  Clinical exam today remains consistent with stenosing tenosynovitis with triggering of  the left long finger.  Given her good response to the previous injection I recommend another today.  We reviewed the risks of infection, elevated blood glucose, nerve injury, tendon injury, blood vessel injury, fatty atrophy, skin depigmentation, and headaches.  She wished to proceed with this today.  She also understands the risks that this may not achieve the desired result and that each injection may give diminishing returns.  See that note below.    Procedure note:  Left long A1 pulley injection:  Informed consent was obtained in the skin overlying the Left A1 pulley was prepped in sterile fashion.  Next a 1 cc solution consisting of 3 mg of betamethasone and a 0.5 cc of 1% plain lidocaine was injected into the Left long A1 pulley.  The patient tolerated the procedure well.  There were no complications.    She is aware that the lidocaine will wear off in a few hours and the steroid will take at least 3 days to take effect.  In the meantime I have shown her how to taper PIP joint with Coban to keep this extended particularly at night for the next week.  Afterwards she can discontinue.  She can also do this during the daytime if she wants.    For both the above and the concern for bilateral hand arthritis I have given her a trial of both Voltaren to use topically 3 times daily as needed as well as Celebrex to her pharmacy.  She does not report numbness when she wakes up in the morning but rather achiness.  This seems to be more diffuse.  Clinical exam does show Tinel's on the right side although she has had a previous carpal tunnel release.  I did briefly review her previous nerve test following her right-sided carpal tunnel release which was several years ago which does show sensory motor conduction parameters above AANEM thresholds.  Although she left before getting this I would like to give her a brace with the right side.  If she returns with a trigger finger we will provide that.  I do not think there is any  rush to proceed with any surgery for recurrent carpal tunnel at present.  I think this is mostly arthritis related.    I also counseled the patient on her smoking habit.  Explained that it could truly change her life if she were to discontinue.  She expressed some desire to try to quit.  I have sent her some Nicoderm patches for a total of 3 month supply.  I will provide more if needed.    The patient is happy with this plan and the care provided.  She will return if her symptoms return and/or do not improve.  Questions were answered.  Information on trigger fingers was provided in her after visit summary for her reading.  I have also provided the carpal tunnel exercises.      QuickDASH: Not obtained today. Will need on return.     Octavio Segundo Jr. MD  Hand and Upper Extremity Surgery  [facility]    Disclaimer:  Voice dictation software was used for this note.  I have reviewed this note and am happy to provide clarification if needed.  However, please excuse typos/errors/omissions.

## 2025-05-28 ENCOUNTER — OFFICE VISIT (OUTPATIENT)
Dept: ORTHOPEDICS | Facility: CLINIC | Age: 70
End: 2025-05-28
Payer: MEDICARE

## 2025-05-28 VITALS — WEIGHT: 153 LBS | HEIGHT: 63 IN | BODY MASS INDEX: 27.11 KG/M2

## 2025-05-28 DIAGNOSIS — M65.332 TRIGGER FINGER, LEFT MIDDLE FINGER: Primary | ICD-10-CM

## 2025-05-28 PROCEDURE — 1125F AMNT PAIN NOTED PAIN PRSNT: CPT | Mod: CPTII,S$GLB,, | Performed by: ORTHOPAEDIC SURGERY

## 2025-05-28 PROCEDURE — 99999 PR PBB SHADOW E&M-EST. PATIENT-LVL III: CPT | Mod: PBBFAC,,, | Performed by: ORTHOPAEDIC SURGERY

## 2025-05-28 PROCEDURE — 99214 OFFICE O/P EST MOD 30 MIN: CPT | Mod: S$GLB,,, | Performed by: ORTHOPAEDIC SURGERY

## 2025-05-28 PROCEDURE — 3288F FALL RISK ASSESSMENT DOCD: CPT | Mod: CPTII,S$GLB,, | Performed by: ORTHOPAEDIC SURGERY

## 2025-05-28 PROCEDURE — 1159F MED LIST DOCD IN RCRD: CPT | Mod: CPTII,S$GLB,, | Performed by: ORTHOPAEDIC SURGERY

## 2025-05-28 PROCEDURE — 1101F PT FALLS ASSESS-DOCD LE1/YR: CPT | Mod: CPTII,S$GLB,, | Performed by: ORTHOPAEDIC SURGERY

## 2025-05-28 PROCEDURE — 3008F BODY MASS INDEX DOCD: CPT | Mod: CPTII,S$GLB,, | Performed by: ORTHOPAEDIC SURGERY

## 2025-05-28 NOTE — PROGRESS NOTES
[facility]    05/28/2025  Daija TORRES Armand    Chief complaint:   Chief Complaint   Patient presents with    Left Hand - Pain     Trigger finger+3rd digit        Pain: 8/10    QuickDASH:  45.5    Interval Hx: Daija is a 70yo RHD female w/ CAD s/p PCI on ASA 81mg, DM2 (last A1c 6), and current >1ppd cigarette use who presents for recurrent locking/triggering of her Left long finger.  She reports a few weeks of relief of the triggering and pain following the injection.  However, since that injection in February, this has returned.  She reports this is very painful and significantly functionally limiting.  She can not use her left hand to grasp objects or do any fine motor functions due to the near immediate triggering of the long finger when she brings this into flexion.  She has to use the other hand to extend this.    The patient was provided with Nicoderm patches.  However, she reports that she is still smoking too much.  This is at least 1 pack per day.  She has found it very difficult to quit.    No interval trauma.  No new or worsening symptoms other than the recurrent locking.  There has been no interval locking or triggering of the adjacent digits.  -----    HPI: Daija is a 70yo RHD female w/ CAD s/p PCI on ASA 81, DM2 (last A1c 6.0) and current 1.5 ppd cigarette use who presents for evaluation of Left long finger pain triggering and locking.  I saw her for this about 9 months ago and my previous place of work.  We provided an injection at that time.  This seems to have worked well but she reports the locking started again about a month ago.  Pain is 7/10 today.  She reports she can not  objects and has a lot of difficulty because of this.  She has taken some anti-inflammatories but not sustained.  She has not tried bracing since this started again.    She also reports some diffuse bilateral hand pain which she describes as achiness in the morning.  This is not numbness  however.  This is more chronic.    Hand dominance: R  Neck pain: N  Prior trauma to head/neck: N  Prior shoulder trauma: N  Prior elbow trauma: N  Prior wrist/hand trauma: N    Duration of symptoms:  14 months total with recurrence 2 months ago after last injection 2/2025  Prior injections:  Yes x2 May 2024, Feb 2025  Bracing:  Yes  Home or outpatient therapy:  Not applicable.  Anti-inflammatories: Yes  Other: Gabapentin    Hx/o CVA/TIAs: N  Personal or family history of demyelinating disease: N  Hx/o Vitamin deficiency and/or malnutrition: N  Metabolic abnormalities acquired/genetic: N  Personal of family hx/o Chiari malformations: N  Oncologic Hx (personal): N  Hx/o chemotherapy: N  Hx/o radiation: N  EtOH abuse: N  Hx/o diabetes 1/2:  Yes type 2   Last A1c:  6.0 11/2024   Distal neuropathy w/ S-W testing: Y/N  Tobacco use (incl Vape):  Yes >1 packs per day; limited efficacy of Nicoderm patches    Anticoagulation/antiplatelet:  Aspirin 81mg  Assistive device use:  None  Osteoporotic/low energy fracture history:  None.  Active steroid, chemotherapy, or other immunomodulator use: None.   Personal or family history of RA or other autoimmune/inflammatory conditions: None.  Hypothyroidism:  No    Other relevant Orthopaedic hx:  Left thumb CMC arthroplasty.  Bilateral hip replacements.  Bilateral carpal tunnel releases.    CT-6 Score: 4/26 (Right)    Amyloidosis Screen:  Tier 1:   M>/=50:    F>/=60: +   Bilateral CT Sx or prior CTR: +  Tier 2:    Lumbar/cervical stenosis:    BTR:   Afib/flutter:   PM:   CHF:   FamHx ATTR amyloidosis:    ROS: No fevers, chills, nausea, vomiting, chest pain, shortness of breath, dizziness, abdominal pain, N/T/P beyond described in HPI.     Past Medical History:   Diagnosis Date    COPD (chronic obstructive pulmonary disease)     Coronary artery disease 03/2018    x1 stent    Depression     Hypercholesteremia     Hypertension     Impaired fasting glucose     Presence of stent in coronary  artery     Type 2 diabetes mellitus without complications        Prior to Admission medications    Medication Sig Start Date End Date Taking? Authorizing Provider   ADVAIR -21 mcg/actuation HFAA inhaler INHALE 2 PUFFS INTO THE LUNGS BY MOUTH TWICE DAILY 2/1/21  Yes Fahad Gold MD   albuterol (PROVENTIL/VENTOLIN HFA) 90 mcg/actuation inhaler INHALE 1 TO 2 PUFFS BY MOUTH EVERY 4 HOURS AS NEEDED 10/6/21  Yes Miracle Sesay MD   aspirin (ECOTRIN) 81 MG EC tablet Take 81 mg by mouth once daily.   Yes Provider, Historical   atorvastatin (LIPITOR) 40 MG tablet Take 1 tablet (40 mg total) by mouth every evening. 1/14/20 2/12/25 Yes Chava Wright MD   bisoprolol (ZEBETA) 10 MG tablet Take 1 tablet (10 mg total) by mouth 2 (two) times a day. 11/18/22  Yes Chava Wright MD   DULoxetine (CYMBALTA) 60 MG capsule TAKE 1 CAPSULE BY MOUTH EVERY DAY AS DIRECTED 9/27/19  Yes Fahad Gold MD   metFORMIN (GLUCOPHAGE) 500 MG tablet TAKE 1 TABLET BY MOUTH TWICE DAILY WITH MEALS 6/2/21  Yes Fahad Gold MD   montelukast (SINGULAIR) 10 mg tablet Take 10 mg by mouth. 8/15/23  Yes Provider, Historical   SPIRIVA WITH HANDIHALER 18 mcg inhalation capsule INL THE CONTENTS OF 1 C  PO ITL D 8/9/20  Yes Provider, Historical   valsartan (DIOVAN) 80 MG tablet Take 1 tablet (80 mg total) by mouth once daily. 3/24/20 2/12/25 Yes Fahad Gold MD   blood sugar diagnostic Strp To check BG 2 times daily, to use with insurance preferred meter 12/26/19   Fahad Gold MD   blood-glucose meter kit To check BG 2 times daily, to use with insurance preferred meter 12/26/19 12/25/20  Fahad Gold MD   celecoxib (CELEBREX) 100 MG capsule Take 1 capsule (100 mg total) by mouth once daily. 2/12/25   Octavio Segundo MD   cyanocobalamin (VITAMIN B-12) 1000 MCG tablet Take 100 mcg by mouth once daily.  Patient not taking: Reported on 2/12/2025    Provider, Historical   diclofenac sodium (VOLTAREN) 1 % Gel  Apply 2 g topically 3 (three) times daily as needed (pain). 2/12/25   Octavio Segundo MD   gabapentin (NEURONTIN) 300 MG capsule Take 300 mg by mouth every evening.  Patient not taking: Reported on 2/12/2025 4/21/21   Provider, Historical   lancets Misc To check BG 2 times daily, to use with insurance preferred meter 12/26/19   Fahad Gold MD   nicotine (NICODERM CQ) 14 mg/24 hr Place 1 patch onto the skin once daily. 2/12/25   Octavio Segundo MD   TRULICITY 0.75 mg/0.5 mL pen injector Inject into the skin. 7/9/23   Provider, Historical   VICTOZA 2-SONY 0.6 mg/0.1 mL (18 mg/3 mL) PnIj pen ADM 0.6 MG SC D  Patient not taking: Reported on 2/12/2025 11/5/20   Provider, Historical   vitamin D (VITAMIN D3) 1000 units Tab Take 2,000 Units by mouth once daily.  Patient not taking: Reported on 2/12/2025    Provider, Historical   diclofenac sodium (VOLTAREN) 1 % Gel Apply 2 g topically once daily. 2/12/25 2/12/25  Octavio Segundo MD       Past Surgical History:   Procedure Laterality Date    ANTERIOR CRUCIATE LIGAMENT REPAIR      BACK SURGERY      BREAST BIOPSY      BREAST SURGERY      CARPAL TUNNEL RELEASE      CERVICAL FUSION      CHOLECYSTECTOMY      COLONOSCOPY N/A 12/12/2018    Procedure: COLONOSCOPY;  Surgeon: Jerry Cha MD;  Location: [facility];  Service: Endoscopy;  Laterality: N/A;    CORONARY ANGIOGRAPHY N/A 03/04/2020    Procedure: ANGIOGRAM, CORONARY ARTERY;  Surgeon: Chava Alvares MD;  Location: [facility] CATH;  Service: Cardiovascular;  Laterality: N/A;    ESOPHAGOGASTRODUODENOSCOPY N/A 12/12/2018    Procedure: EGD (ESOPHAGOGASTRODUODENOSCOPY);  Surgeon: Jerry Cha MD;  Location: [facility] ENDO;  Service: Endoscopy;  Laterality: N/A;    JOINT REPLACEMENT      LAPAROSCOPIC CHOLECYSTECTOMY N/A 02/11/2019    Procedure: CHOLECYSTECTOMY-LAPAROSCOPIC;  Surgeon: Rome Arceo MD;  Location: [facility] OR;  Service: General;  Laterality: N/A;    LEFT HEART CATHETERIZATION Left 03/04/2020     Procedure: Left heart cath;  Surgeon: Chava Alvares MD;  Location: [facility] CATH;  Service: Cardiovascular;  Laterality: Left;    ROTATOR CUFF REPAIR Right     thumb Left     fusion    TOTAL HIP ARTHROPLASTY Bilateral        Family History   Problem Relation Name Age of Onset    Heart disease Mother      Heart disease Father      Diabetes Father         Social History     Socioeconomic History    Marital status:    Tobacco Use    Smoking status: Every Day     Current packs/day: 1.00     Average packs/day: 1 pack/day for 45.0 years (45.0 ttl pk-yrs)     Types: Cigarettes    Smokeless tobacco: Never   Substance and Sexual Activity    Alcohol use: Yes     Comment: 2 glasses wine every evening    Drug use: No    Sexual activity: Not Currently     Social Drivers of Health     Financial Resource Strain: Low Risk  (11/20/2024)    [facility] and Its Subsidiaries and Affiliates    Overall Financial Resource Strain (CARDIA)     Difficulty of Paying Living Expenses: Not hard at all   Food Insecurity: No Food Insecurity (11/20/2024)    Received from [facility] and Its Subsidiaries and Affiliates    Hunger Vital Sign     Worried About Running Out of Food in the Last Year: Never true     Ran Out of Food in the Last Year: Never true   Transportation Needs: No Transportation Needs (11/20/2024)    Received from [facility] and Its Subsidiaries and Affiliates    PRAPARE - Transportation     Lack of Transportation (Medical): No     Lack of Transportation (Non-Medical): No   Physical Activity: Insufficiently Active (3/9/2022)    Received from [facility] and Its Subsidiaries and Affiliates    Exercise Vital Sign     Days of Exercise per Week: 1 day     Minutes of Exercise per Session: 30 min   Stress: No Stress Concern Present (3/9/2022)    Received from [facility] and Its Subsidiaries and Affiliates    [place] [employer] - Occupational Stress Questionnaire     Feeling of Stress : Not at all   Housing Stability: Low Risk  (11/20/2024)    Received from [facility] and Its Subsidiaries and Affiliates    Housing Stability Vital Sign     Unable to Pay for Housing in the Last Year: No     Number of Times Moved in the Last Year: 0     Homeless in the Last Year: No       Review of patient's allergies indicates:  No Known Allergies      Physical Exam (changes):     Patient is alert, well-appearing, and in no acute distress. Breathing comfortably. Extraocular muscles are intact. Pupils equal. Facial muscles symmetric. Voice with good intonation. No ptosis, anhidrosis, or miosis.      C-spine/Plexus/Shoulder  There is no discomfort with cervical flexion extension and lateral rotation. No radicular/neuritic symptoms down either UE.   No cervical paraspinal tenderness to palpation.  Negative Marvin test although w/ pain on Left hand due to long TF.   No jugular engorgement or distal ischemia w/ shoulder ABD/ER.  Pulses are symmetric in abduction external rotation at the wrist and at the side.  Bilateral shoulder forward elevation is 140 and painless.  Internal rotation is to L4-5 and painless.  External rotation is 50-60 bilaterally and painless.  Tinel's interscalene/pec minor: NT.   No masses or fullness at neck/SC fossa.     Myelopathic signs:   Marina's: No  Reflexes   Tri: 1/4   Bi: 1/4   Br: 1/4    Cerebellar exam:  Craig:  Not tested  FTN:  Not tested  Dysdiadochokinesia:  Not tested     Distal LUE exam:  No resting or volitional tremors.   No skin changes or discoloration.  No wounds rashes or lesions.   No atrophy or fasciculations.  There is a well-healed Left carpal tunnel and a Left left thumb CMC incision.    There is focal pain to palpation over the left long A1 pulley.  There is  active triggering with closed fist  and PIP flexion past approx 70deg. This is painful and the reduction maneuver requires contralateral hand. She has limited and apprehensive motion of adjacent digits. No pain over adjacent A1 pulleys or thumb.  Dorsally there was no extensor tendon subluxation.  No palpable masses over the A1 pulley or the flexor sheath.  No pain to palpation over the adjacent A1 pulleys.  There is no resting PIP contracture.    Elbow, wrist, and extrinsic digital motion is painless.   Pronosupination is full and painless.  No crepitus.  No clicks or mechanical blocks to motion.    Wrist flexion: 50 left  Wrist extension: 60 left  Radial dev: 10-15 left  Ulnar dev: 30 left    DRUJ: Stable and painless to manipulation in supination, pronation, and neutral.     TTP ulna fovea:  No  TTP ECU insertion:  No  TTP 6th DC at distal ulna:  No    Scaphoid shift:  Not tested  Midcarpal shift/manipulation:  Not tested    There is no fullness or any prominences along the supracondylar ridge of Left elbow.  No palpable epitrochlear lymph nodes.  No pain or tenderness along the AIN/pronator interval.  No Tinel's along SRN.   No allodynia, no hyperpathia, no other signs of RSD/CRPS.   No pain over FCU.   No pain over FCR at wrist or proximally.   Medical epicondyle TTP: No.   Lateral epicondyle TTP: No.   Ulnar tunnel/hook of hamate: No pain or fullness to palpation. No pulsatile masses.   No pain over 1st dorsal compartment.     Thumb CMC  Pain at CMC/STT: No.   Left thumb CMC joint is stable to axial loading without crepitus.  Slight Z deformity.  First webspace adduction contracture?  No  Z-deformity and/or MCP hyperextension?  Yes although correctable.              Passively correctable?  Not applicable.  MCP flexion is at least 50°.    Kapandji score: 10/10    Sensation:  2 point discriminative touch is </= 5mm on both sides.  Slightly diminished accuracy in the left index finger but otherwise  "normal.  DBUN deficit: None.   SRN: deficit: None.     APB atrophy: No.   FDI atrophy: No  ADM atrophy: No.    APB: 4+/5 on Left.   FPB: 5/5  FDI: 5/5  ADM: 5/5    :  29 lbs L, 56 lbs R  Key pinch:  8lbs L, 14 lbs R     Tinel's wrist:  Positive on right  Durkan's:  Negative  Phalen's:  Negative  Reverse Phalen's:  Negative     Tinel's elbow:  Negative  Elbow flexion test:  Negative  Ulnar nerve instability:  Negative    Wartenburg's:  Negative.  Froment's:  Not tested.    Palpable radial pulse at wrist. Fingers well-perfused with CR <2s. No swelling in digits.     Imaging:  No new imaging today.  Previous dictation as follows:  AP, lateral, and oblique x-rays of the Left hand obtained today in clinic were reviewed and independently interpreted and demonstrate no acute displaced fractures, dislocations, or subluxations.  No significant MCP degenerative changes.  Mild IP joint degenerative changes in the small and index fingers worse at the PIP.  There are findings status post trapezium and partial trapezoid resection arthroplasty with settling of the thumb metacarpal base although without any scaphoid metacarpal impingement.  Radiographic Z deformity is noted with adduction of the thumb metacarpal.  No interval arthrosis at the scaphoid metacarpal articulation or the scaphotrapezoid articulation.  No radiopaque foreign bodies or calcifications noted over the long finger.  No heterotopic ossification.    Labs:    Last A1c: 6.0 11/2024    Studies:  Previous dictation: "I did briefly review the previous bilateral upper extremity nerve test performed by Dr. Gordillo dated 08/29/2023 which does show both sensory and motor conduction parameters of right carpal tunnel syndrome based on AANEM criteria.  This is much worse on the left side and she is status post carpal tunnel release.  Findings on that side are non recordable sensory potentials with left median APB latency of 6.98.  No EMG changes."    To reiterate, the " patient has since undergone bilateral carpal tunnel releases with good postoperative result.    CT-6 Score: 4/26 (Right)    Assessment:  Left long stenosing tenosynovitis w/ triggering, chronic and refractory to bracing and injections x2 (May 2024, Feb 2025)  Tobacco use disorder, >1ppd use despite Nicoderm patches provided last visit    Plan:  Daija is a 70yo RHD female with CAD status post PCI on ASA 81, DM2 (last A1c 6.0), and current >1ppd cigarette use who presents for evaluation of chronic, recurrent Left long finger pain and locking.  This is significantly functionally limiting for her.  She is apprehensive use of the left hand and can not use this to palmar grasp objects due to rapid locking of the long finger in flexion.  She has to use the contralateral hand to open this.  She has a very limited in use with her left hand because of this.    I did  the patient last time on her tobacco use habit.  I did provide some Nicoderm patches but she reports that she is still smoking too much which is over 1 pack per day.  I particularly reviewed her tobacco use habit in light of her trigger finger.  The patient expressed desire to move forward with surgical release.  She does not want to continue with nonoperative treatments.  She has painful locking and has to use the other hand open this up.  Every time she brings the fingers into brisk flexion her long finger locks.  This is every time.  I did make her aware that given her current tobacco use habit as well as her diabetic status she is at increased risk of infection.  Her A1c is relatively under control but her tobacco use habit is quite significant.  We reviewed that she is at very high risk of wound healing complications, infection, and other complications.  She expressed understanding.  However, she does not want to continue with nonoperative treatment or observation/expectant management given the severe pain and functional limitations from her left  long trigger finger.    Informed Consent:  -I reviewed with the patient what surgery entails for his particular condition.  I reviewed the location and the size of the incision.  I reviewed that most patients do not require Hand therapy postoperatively but if she develops any symptomatic stiffness or pain I can get him set up for this at the initial postoperative visit.  We are not going to tentatively plan for this. For the surgery itself we reviewed the risks of infection, wound dehiscence, delayed wound healing, scar sensitivity and pain, chronic regional pain syndrome, digital nerve injury, digital blood vessel injury, tendon injury to both the FDS and FDP tendons, recurrent triggering/locking, stiffness, persistent flexion contracture, delayed or incomplete return of  strength, and need for additional procedures done at the time of surgery and/or in delayed fashion.  I explained that there can be recurrent triggering alone from an efficient gliding of the 2 tendons at the decussation at the A1 pulley.  If this happens, we may need to excise a portion of 1 of the tendons.  Alternatively, if there is triggering of the A2 pulley this may also need to be addressed.  These are uncommon situations but will be done intraoperatively if needed. Based on clinical exam, this does seem to be isolated to his palm over the A1 pulley. Lastly, I also reviewed that this can typically done wide awake under local anesthesia only.  The patient would prefer to do this as opposed to in the operating room under IV sedation to limit out-of-pocket cost as well as the time under anesthesia.    -Lastly, I reviewed again given her diabetic status and her tobacco use habit that she is at increased risk of wound healing complications, skin flap necrosis, infection, and other complications.  Her diabetes is relatively well controlled with a last A1c of 6.0.  However, she does smoke over a pack of cigarettes per day.  She has tried the  "Nicoderm patches provided previously and this has not slowed significantly.  To help limit the risk of infection and wound healing complications following the procedure, we are going to proceed with twice daily wound care starting on postoperative day 3.  Instructions for that were provided to the patient.    -The patient expressed understanding and would like to proceed with surgery.  Informed consent was obtained today for a Left long A1 pulley release and other indicated procedures.  Given her elevated preop amyloid screen, we are also going to plan for tenosynovial tissue sampling and we will send this for a Congo red stain.    -Detailed verbal and written list of risks and pre and postop expectations were provided to the patient.      She is happy with this plan and wants to proceed.  All questions were answered.    QuickDASH: 45.5    Octavio Segundo Jr. MD  Hand and Upper Extremity Surgery  [facility]    Preop Notes:  Previous Anesthesia Complications: None.   Personal/FamHx Malignant Hyperthermia: None.   Allergies (adhesive/pharmacologic/shellfish/latex): None.   Preop Abx: None--"clean" hand surgery protocol.   Preop block: None--local at surgical site: 10cc 50:50 1% Lidocaine plain:0.25% Marcaine plain.   Anticoagulation: Patient is ok to resume ASA and any other AC--does NOT need to hold.   Amyloidosis Screen: Will plan for tenosynovial +/- TCL Bx for Congo Red staining. Referral if indicated thereafter.   Other: Start BID soaks in warm, soapy water (antibacterial soap vs Hibiclens) POD3; 10min each soak. Replaced bacitracin ointment, sterile 4x4, 2" ACE. 4-0 Prolene for closure. Esmarch tourniquet (if needed). 27ga 1.5" needle to inject. 1L back table sterile saline irrigation. No preop abx. Bacitracin ointment, xeroform, sterile 4x4, sterile 2" cast padding, sterile 2" ACE.     Disclaimer:  Voice dictation software was used for this note.  I have reviewed this note and am happy to " provide clarification if needed.  However, please excuse typos/errors/omissions.

## 2025-06-05 ENCOUNTER — PROCEDURE VISIT (OUTPATIENT)
Dept: ORTHOPEDICS | Facility: CLINIC | Age: 70
End: 2025-06-05
Payer: MEDICARE

## 2025-06-05 ENCOUNTER — PATIENT MESSAGE (OUTPATIENT)
Dept: ORTHOPEDICS | Facility: CLINIC | Age: 70
End: 2025-06-05

## 2025-06-05 VITALS
BODY MASS INDEX: 27.11 KG/M2 | HEART RATE: 74 BPM | DIASTOLIC BLOOD PRESSURE: 77 MMHG | OXYGEN SATURATION: 98 % | WEIGHT: 153 LBS | SYSTOLIC BLOOD PRESSURE: 121 MMHG | TEMPERATURE: 99 F | RESPIRATION RATE: 18 BRPM | HEIGHT: 63 IN

## 2025-06-05 DIAGNOSIS — M65.332 TRIGGER FINGER, LEFT MIDDLE FINGER: Primary | ICD-10-CM

## 2025-06-05 RX ORDER — BUPIVACAINE HYDROCHLORIDE 2.5 MG/ML
5 INJECTION, SOLUTION INFILTRATION; PERINEURAL
Status: COMPLETED | OUTPATIENT
Start: 2025-06-05 | End: 2025-06-05

## 2025-06-05 RX ORDER — HYDROCODONE BITARTRATE AND ACETAMINOPHEN 5; 325 MG/1; MG/1
1 TABLET ORAL EVERY 6 HOURS PRN
Qty: 5 TABLET | Refills: 0 | Status: SHIPPED | OUTPATIENT
Start: 2025-06-05

## 2025-06-05 RX ORDER — LIDOCAINE HYDROCHLORIDE 10 MG/ML
5 INJECTION, SOLUTION EPIDURAL; INFILTRATION; INTRACAUDAL; PERINEURAL
Status: COMPLETED | OUTPATIENT
Start: 2025-06-05 | End: 2025-06-05

## 2025-06-05 RX ADMIN — BUPIVACAINE HYDROCHLORIDE 12.5 MG: 2.5 INJECTION, SOLUTION INFILTRATION; PERINEURAL at 11:06

## 2025-06-05 RX ADMIN — LIDOCAINE HYDROCHLORIDE 50 MG: 10 INJECTION, SOLUTION EPIDURAL; INFILTRATION; INTRACAUDAL; PERINEURAL at 11:06

## 2025-06-11 ENCOUNTER — OFFICE VISIT (OUTPATIENT)
Dept: ORTHOPEDICS | Facility: CLINIC | Age: 70
End: 2025-06-11
Payer: MEDICARE

## 2025-06-11 VITALS — WEIGHT: 153 LBS | HEIGHT: 63 IN | BODY MASS INDEX: 27.11 KG/M2

## 2025-06-11 DIAGNOSIS — M65.332 TRIGGER FINGER, LEFT MIDDLE FINGER: Primary | ICD-10-CM

## 2025-06-11 PROCEDURE — 99999 PR PBB SHADOW E&M-EST. PATIENT-LVL II: CPT | Mod: PBBFAC,,, | Performed by: ORTHOPAEDIC SURGERY

## 2025-06-11 PROCEDURE — 99024 POSTOP FOLLOW-UP VISIT: CPT | Mod: S$GLB,,, | Performed by: ORTHOPAEDIC SURGERY

## 2025-06-11 PROCEDURE — 1126F AMNT PAIN NOTED NONE PRSNT: CPT | Mod: CPTII,S$GLB,, | Performed by: ORTHOPAEDIC SURGERY

## 2025-06-11 PROCEDURE — 3288F FALL RISK ASSESSMENT DOCD: CPT | Mod: CPTII,S$GLB,, | Performed by: ORTHOPAEDIC SURGERY

## 2025-06-11 PROCEDURE — 1101F PT FALLS ASSESS-DOCD LE1/YR: CPT | Mod: CPTII,S$GLB,, | Performed by: ORTHOPAEDIC SURGERY

## 2025-06-11 NOTE — PROGRESS NOTES
Post-operative Visit Note  2025    QuickDASH: 3.4  Pain: 0/10    Procedure: Left long A1 pulley release w/ tenosynovial sampling (PENDING)  Procedure date: 25    Subjective: Daija Traylor is a 70 y.o. RHD female who is now POD6 s/p Left long A1 pulley release w/ tenosynovial sampling.  Tissue pathology is pending.  Given her smoking status, we did discuss more aggressive wound care.  She has been doing the soaks.  She has been keeping this covered.  She has been applying antibiotic ointment.  She has had no fevers or chills.  No surgical site infection concerns.  More importantly, she has no further triggering.  She has been very pleased with the postoperative result.  Pain and motion are significantly improved as well as general use of the hand.  She is hoping to get back to doing dishes soon.    No interval deficits.  Specifically, no sensory deficits.    Pain regimen has been sufficient.  She is not requiring narcotics.  She recovered from local anesthesia without incident.  She has resumed all home medications.     Objective:   There were no vitals filed for this visit.  Patient is alert, well-appearing, and breathing comfortably. No acute distress. EOMI. Facial mm symmetric. Voice with good intonation.     Exam of operative hand:  Incision is well-approximated. No drainage. No purulence. No dehiscence.  No swelling.  No erythema.  No lymphadenopathy.  Minimal tenderness to palpation.    Sensation:  No sensory deficits noted in palmar cutaneous br median nerve.   No sensory deficits noted in palmar cutaneous ulnar nerve.   No DBUN deficits.   No SRN deficits.   2pt touch in thumb/index/lonmm  2pt touch in small/rinmm    Motor:  APB: 5/5  FBP: 5/5  FDI: 5/5  ADM: 5/5    The patient is able to bring all the fingers of touchdown in the palm.  With extension there was no PIP contracture or any triggering.  There is smooth, fluid motion.  This motion is painless.    Kapandji score: NT  "today    Tip-palm : 0  cm     strength: 24 lbs L (29 lbs preop), 58 lbs R  Elizalde pinch: NT today    Extrinsic digital flexion/extension is full and painless. No flexion lags. With forceful  there is no pain or any painful "bowstringing" sensations.     Wrist motion:   Flexion: 60    Extension: 60   Radial dev: 10   Ulnar dev: 30   Supination: symmetric to contralateral   Pronation: symmetric to contralateral    Perfusion:  Palpable radial + ulnar pulses at wrist, 2+.   CR <2s in all digits.  Symmetric trigger/color/refill/temperature.    No allodynia, no hyperpathia, no other signs of RSD/CRPS.     Imaging: None today.    Labs:  Tissue pathology was reviewed and still pending.    Assessment:  POD6 s/p Left long A1 pulley release w/ tenosynovial sampling (PENDING)      QuickDASH: 3.4    Plan:   Daija Traylor is a 70 y.o. RHD female who is now POD6 s/p  Left long A1 pulley release.  Lorelei synovial tissue sampling for amyloid stain is pending.  She is doing very well.  She has significant improvement in function of the hand without any triggering since the operation.  No sensory deficits.  She is very happy with the postoperative result.    Given her smoking habit, we did discuss an alteration to the postoperative protocol.  She has been doing soaks twice daily and applying antibiotic ointment over the incision.  The surgical site is benign-appearing today.  However, we are going to leave the stitches in for 1 more week.  She will return at that time.  I did recommend that she hold off on submerging the hand particularly in dish water for another week.      Wound care:  Okay to get this wet in the shower.  Avoid submerging/tub/DISH sings for 1 more week until suture removed.    We will proceed with scar massage after stitch removed next week.  The patient understands warning signs of surgical site infection and will let us know if these develop. The patient has good judgement and awareness. " "    Rehab:  Patient will continue with tendon gliding exercises.  For comfort I have recommended the patient limit lifting to no more than 2.5lbs for the next week.  She may progress to tolerance thereafter. They are aware that it may take up to 3 months for  strength to return to "normal".     The patient may return to work "as tolerated" at the 2 week arlene as he/she does not handle heavy machinery, work from height, work in law enforcement capacity, perform patient care, or operate vibratory/oscillatory equipment.     Follow-up:  1 week (2 weeks post-op). Patient is aware that they may return sooner if needed. Interval visits can also be done via eVisit if there are concerns regarding the surgical site. However, I would like the patient to return in person for the 2 week visit for quick dash, /key pinch measurements, suture removal, and scar assessment.     She is happy with this plan and her outcome.  All questions were answered.    Octavio Segundo Jr. MD  Hand and Upper Extremity Surgery  [facility]                                  "

## 2025-06-18 ENCOUNTER — OFFICE VISIT (OUTPATIENT)
Dept: ORTHOPEDICS | Facility: CLINIC | Age: 70
End: 2025-06-18
Payer: MEDICARE

## 2025-06-18 VITALS — BODY MASS INDEX: 27.11 KG/M2 | HEIGHT: 63 IN | WEIGHT: 153 LBS

## 2025-06-18 DIAGNOSIS — M65.332 TRIGGER FINGER, LEFT MIDDLE FINGER: Primary | ICD-10-CM

## 2025-06-18 PROCEDURE — 3288F FALL RISK ASSESSMENT DOCD: CPT | Mod: CPTII,S$GLB,, | Performed by: ORTHOPAEDIC SURGERY

## 2025-06-18 PROCEDURE — 99024 POSTOP FOLLOW-UP VISIT: CPT | Mod: S$GLB,,, | Performed by: ORTHOPAEDIC SURGERY

## 2025-06-18 PROCEDURE — 1125F AMNT PAIN NOTED PAIN PRSNT: CPT | Mod: CPTII,S$GLB,, | Performed by: ORTHOPAEDIC SURGERY

## 2025-06-18 PROCEDURE — 1101F PT FALLS ASSESS-DOCD LE1/YR: CPT | Mod: CPTII,S$GLB,, | Performed by: ORTHOPAEDIC SURGERY

## 2025-06-18 PROCEDURE — 99999 PR PBB SHADOW E&M-EST. PATIENT-LVL III: CPT | Mod: PBBFAC,,, | Performed by: ORTHOPAEDIC SURGERY

## 2025-06-18 PROCEDURE — 1159F MED LIST DOCD IN RCRD: CPT | Mod: CPTII,S$GLB,, | Performed by: ORTHOPAEDIC SURGERY

## 2025-06-18 NOTE — PROGRESS NOTES
"Post-operative Visit Note  2025    QuickDASH: 15.9  Pain: 2/10    Procedure: Left long A1 pulley release w/ tenosynovial sampling (NEGATIVE)  Procedure date: 25    Subjective: Daija Tralyor is a 70 y.o. RHD female who is now 2 weeks s/p Left long A1 pulley release w/ tenosynovial sampling.  Tissue pathology is negative for amyloid deposition.  Given her smoking status, we did discuss more aggressive wound care.  She has continue the soaks.  She has had no fevers or chills.  No wound concerns.  No drainage.  She has had no recurrent triggering.  She is happy with the postoperative result.  She is having some slight soreness referred to the PIP joint but nothing significant.    Objective:   There were no vitals filed for this visit.  Patient is alert, well-appearing, and breathing comfortably. No acute distress. EOMI. Facial mm symmetric. Voice with good intonation.     Exam of operative hand:  Incision is well-healed.  No drainage.  No purulence.  No dehiscence.  Suture was removed today without tension dehiscence.  Minimal pain to palpation.  No PIP contracture.  No erythema.  No lymphadenopathy.    Sensation:  No sensory deficits noted in palmar cutaneous br median nerve.   No sensory deficits noted in palmar cutaneous ulnar nerve.   No DBUN deficits.   No SRN deficits.   2pt touch in thumb/index/lonmm  2pt touch in small/rinmm    Motor:  APB: 5/5  FBP: 5/5  FDI: 5/5  ADM: 5/5    The patient is able to bring all the fingers of touchdown in the palm.  With extension there was no PIP contracture or any triggering.  There is smooth, fluid motion.  This motion is painless.    Kapandji score: 10/10    Tip-palm : 0  cm     strength: 32 lbs L (24 lbs prev, 29lbs preop), 55 lbs R (58 lbs prev)  Key pinch: 14 lbs R, 6 lbs L    Extrinsic digital flexion/extension is full and painless. No flexion lags. With forceful  there is no pain or any painful "bowstringing" sensations.     Wrist " "motion:   Flexion: 60    Extension: 60   Radial dev: 10   Ulnar dev: 30   Supination: symmetric to contralateral   Pronation: symmetric to contralateral    Perfusion:  Palpable radial + ulnar pulses at wrist, 2+.   CR <2s in all digits.  Symmetric trigger/color/refill/temperature.    No allodynia, no hyperpathia, no other signs of RSD/CRPS.     Imaging: None today.    Labs:  Tissue pathology was reviewed and negative for amyloid deposition.    Assessment:  2 weeks s/p Left long A1 pulley release w/ tenosynovial sampling (NEGATIVE)      QuickDASH: 15.9    Plan:   Daija Traylor is a 70 y.o. RHD female who is now 2 weeks s/p  Left long A1 pulley release.  Tenosynovial tissue sampling for amyloid stain is negative.  She continues to do well.  She is having some slight pain referred to the PIP joint but nothing significant.  She has been doing the soaks.  Her wound is healed up nicely.  Suture was removed today without tendon dehiscence.  She is happy with the postoperative result.    Wound care:  Okay to get this wet as needed.  Okay to discontinue wound care/soaks.    Okay to progress to scar massage with vitamin-E based ointment/creams.    Rehab:  Patient will continue with tendon gliding exercises.  She may progress to strengthening and activity per tolerance at this time.  She is aware that it may take up to 3 months for  strength to return to "normal".     The patient may return to work "as tolerated" as she does not handle heavy machinery, work from height, work in law enforcement capacity, perform patient care, or operate vibratory/oscillatory equipment.     Follow-up:  4 weeks (6 weeks post-op). Patient is aware that they may return sooner if needed. Interval visits can also be done via eVisit if there are concerns regarding the surgical site. However, I would like the patient to return in person for the 6 week visit for QuickDash, /key pinch measurements, suture removal, and scar assessment.     She " is happy with this plan and her outcome.  All questions were answered.    Octavio Segundo Jr. MD  Hand and Upper Extremity Surgery  [facility]

## 2025-07-16 ENCOUNTER — TELEPHONE (OUTPATIENT)
Dept: ORTHOPEDICS | Facility: CLINIC | Age: 70
End: 2025-07-16
Payer: MEDICARE

## 2025-07-16 NOTE — TELEPHONE ENCOUNTER
Daija was due for her 6 week f/up today.  Unfortunately, she was unable to show for this.  We are going to try to get her back into soon as able.    Octavio Segundo Jr. MD  Hand and Upper Extremity Surgery  [facility]